# Patient Record
Sex: MALE | Race: WHITE | URBAN - METROPOLITAN AREA
[De-identification: names, ages, dates, MRNs, and addresses within clinical notes are randomized per-mention and may not be internally consistent; named-entity substitution may affect disease eponyms.]

---

## 2018-06-27 ENCOUNTER — EMERGENCY (EMERGENCY)
Facility: HOSPITAL | Age: 43
LOS: 0 days | Discharge: HOME | End: 2018-06-28
Attending: EMERGENCY MEDICINE | Admitting: EMERGENCY MEDICINE

## 2018-06-27 VITALS
OXYGEN SATURATION: 96 % | TEMPERATURE: 99 F | SYSTOLIC BLOOD PRESSURE: 139 MMHG | HEART RATE: 70 BPM | DIASTOLIC BLOOD PRESSURE: 86 MMHG | RESPIRATION RATE: 18 BRPM

## 2018-06-27 VITALS
HEIGHT: 72 IN | SYSTOLIC BLOOD PRESSURE: 158 MMHG | TEMPERATURE: 98 F | WEIGHT: 179.9 LBS | HEART RATE: 68 BPM | OXYGEN SATURATION: 96 % | DIASTOLIC BLOOD PRESSURE: 90 MMHG | RESPIRATION RATE: 19 BRPM
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
## 2018-06-27 DIAGNOSIS — F17.210 NICOTINE DEPENDENCE, CIGARETTES, UNCOMPLICATED: ICD-10-CM
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
## 2018-06-27 DIAGNOSIS — Z79.899 OTHER LONG TERM (CURRENT) DRUG THERAPY: ICD-10-CM

## 2018-06-27 DIAGNOSIS — S20.311A ABRASION OF RIGHT FRONT WALL OF THORAX, INITIAL ENCOUNTER: ICD-10-CM
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
## 2018-06-27 DIAGNOSIS — S40.812A ABRASION OF LEFT UPPER ARM, INITIAL ENCOUNTER: ICD-10-CM

## 2018-06-27 DIAGNOSIS — S01.01XA LACERATION WITHOUT FOREIGN BODY OF SCALP, INITIAL ENCOUNTER: ICD-10-CM

## 2018-06-27 DIAGNOSIS — S00.81XA ABRASION OF OTHER PART OF HEAD, INITIAL ENCOUNTER: ICD-10-CM
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
## 2018-06-27 LAB
ALBUMIN SERPL ELPH-MCNC: 3.8 G/DL — SIGNIFICANT CHANGE UP (ref 3.5–5.2)
ALBUMIN SERPL ELPH-MCNC: 4.3 G/DL — SIGNIFICANT CHANGE UP (ref 3.5–5.2)
ALP SERPL-CCNC: 61 U/L — SIGNIFICANT CHANGE UP (ref 30–115)
ALP SERPL-CCNC: 75 U/L — SIGNIFICANT CHANGE UP (ref 30–115)
ALT FLD-CCNC: 43 U/L — HIGH (ref 0–41)
ALT FLD-CCNC: 49 U/L — HIGH (ref 0–41)
ANION GAP SERPL CALC-SCNC: 10 MMOL/L — SIGNIFICANT CHANGE UP (ref 7–14)
ANION GAP SERPL CALC-SCNC: 13 MMOL/L — SIGNIFICANT CHANGE UP (ref 7–14)
APAP SERPL-MCNC: <5 UG/ML — LOW (ref 10–30)
APPEARANCE UR: CLEAR — SIGNIFICANT CHANGE UP
APTT BLD: 27.3 SEC — SIGNIFICANT CHANGE UP (ref 27–39.2)
APTT BLD: 32.7 SEC — SIGNIFICANT CHANGE UP (ref 27–39.2)
AST SERPL-CCNC: 37 U/L — SIGNIFICANT CHANGE UP (ref 0–41)
AST SERPL-CCNC: 40 U/L — SIGNIFICANT CHANGE UP (ref 0–41)
BASOPHILS # BLD AUTO: 0.05 K/UL — SIGNIFICANT CHANGE UP (ref 0–0.2)
BASOPHILS # BLD AUTO: 0.07 K/UL — SIGNIFICANT CHANGE UP (ref 0–0.2)
BASOPHILS NFR BLD AUTO: 0.4 % — SIGNIFICANT CHANGE UP (ref 0–1)
BASOPHILS NFR BLD AUTO: 0.7 % — SIGNIFICANT CHANGE UP (ref 0–1)
BILIRUB SERPL-MCNC: 0.3 MG/DL — SIGNIFICANT CHANGE UP (ref 0.2–1.2)
BILIRUB SERPL-MCNC: 0.3 MG/DL — SIGNIFICANT CHANGE UP (ref 0.2–1.2)
BILIRUB UR-MCNC: NEGATIVE — SIGNIFICANT CHANGE UP
BUN SERPL-MCNC: 20 MG/DL — SIGNIFICANT CHANGE UP (ref 10–20)
BUN SERPL-MCNC: 24 MG/DL — HIGH (ref 10–20)
CALCIUM SERPL-MCNC: 8.3 MG/DL — LOW (ref 8.5–10.1)
CALCIUM SERPL-MCNC: 9.7 MG/DL — SIGNIFICANT CHANGE UP (ref 8.5–10.1)
CHLORIDE SERPL-SCNC: 103 MMOL/L — SIGNIFICANT CHANGE UP (ref 98–110)
CHLORIDE SERPL-SCNC: 106 MMOL/L — SIGNIFICANT CHANGE UP (ref 98–110)
CO2 SERPL-SCNC: 24 MMOL/L — SIGNIFICANT CHANGE UP (ref 17–32)
CO2 SERPL-SCNC: 27 MMOL/L — SIGNIFICANT CHANGE UP (ref 17–32)
COLOR SPEC: YELLOW — SIGNIFICANT CHANGE UP
CREAT SERPL-MCNC: 0.9 MG/DL — SIGNIFICANT CHANGE UP (ref 0.7–1.5)
CREAT SERPL-MCNC: 1.2 MG/DL — SIGNIFICANT CHANGE UP (ref 0.7–1.5)
DIFF PNL FLD: NEGATIVE — SIGNIFICANT CHANGE UP
EOSINOPHIL # BLD AUTO: 0.23 K/UL — SIGNIFICANT CHANGE UP (ref 0–0.7)
EOSINOPHIL # BLD AUTO: 0.61 K/UL — SIGNIFICANT CHANGE UP (ref 0–0.7)
EOSINOPHIL NFR BLD AUTO: 2 % — SIGNIFICANT CHANGE UP (ref 0–8)
EOSINOPHIL NFR BLD AUTO: 6.1 % — SIGNIFICANT CHANGE UP (ref 0–8)
ETHANOL SERPL-MCNC: <10 MG/DL — HIGH
ETHANOL SERPL-MCNC: <10 MG/DL — HIGH
GLUCOSE SERPL-MCNC: 96 MG/DL — SIGNIFICANT CHANGE UP (ref 70–99)
GLUCOSE SERPL-MCNC: 98 MG/DL — SIGNIFICANT CHANGE UP (ref 70–99)
GLUCOSE UR QL: NEGATIVE MG/DL — SIGNIFICANT CHANGE UP
HCT VFR BLD CALC: 37 % — LOW (ref 42–52)
HCT VFR BLD CALC: 41.3 % — LOW (ref 42–52)
HGB BLD-MCNC: 12.7 G/DL — LOW (ref 14–18)
HGB BLD-MCNC: 14.2 G/DL — SIGNIFICANT CHANGE UP (ref 14–18)
IMM GRANULOCYTES NFR BLD AUTO: 0.5 % — HIGH (ref 0.1–0.3)
IMM GRANULOCYTES NFR BLD AUTO: 0.7 % — HIGH (ref 0.1–0.3)
INR BLD: 1.13 RATIO — SIGNIFICANT CHANGE UP (ref 0.65–1.3)
INR BLD: 1.27 RATIO — SIGNIFICANT CHANGE UP (ref 0.65–1.3)
KETONES UR-MCNC: NEGATIVE — SIGNIFICANT CHANGE UP
LACTATE SERPL-SCNC: 0.7 MMOL/L — SIGNIFICANT CHANGE UP (ref 0.5–2.2)
LACTATE SERPL-SCNC: 1.1 MMOL/L — SIGNIFICANT CHANGE UP (ref 0.5–2.2)
LEUKOCYTE ESTERASE UR-ACNC: NEGATIVE — SIGNIFICANT CHANGE UP
LIDOCAIN IGE QN: 27 U/L — SIGNIFICANT CHANGE UP (ref 7–60)
LIDOCAIN IGE QN: 27 U/L — SIGNIFICANT CHANGE UP (ref 7–60)
LYMPHOCYTES # BLD AUTO: 1.63 K/UL — SIGNIFICANT CHANGE UP (ref 1.2–3.4)
LYMPHOCYTES # BLD AUTO: 14 % — LOW (ref 20.5–51.1)
LYMPHOCYTES # BLD AUTO: 4.38 K/UL — HIGH (ref 1.2–3.4)
LYMPHOCYTES # BLD AUTO: 43.8 % — SIGNIFICANT CHANGE UP (ref 20.5–51.1)
MCHC RBC-ENTMCNC: 30.2 PG — SIGNIFICANT CHANGE UP (ref 27–31)
MCHC RBC-ENTMCNC: 30.5 PG — SIGNIFICANT CHANGE UP (ref 27–31)
MCHC RBC-ENTMCNC: 34.3 G/DL — SIGNIFICANT CHANGE UP (ref 32–37)
MCHC RBC-ENTMCNC: 34.4 G/DL — SIGNIFICANT CHANGE UP (ref 32–37)
MCV RBC AUTO: 87.9 FL — SIGNIFICANT CHANGE UP (ref 80–94)
MCV RBC AUTO: 88.6 FL — SIGNIFICANT CHANGE UP (ref 80–94)
MONOCYTES # BLD AUTO: 0.61 K/UL — HIGH (ref 0.1–0.6)
MONOCYTES # BLD AUTO: 0.62 K/UL — HIGH (ref 0.1–0.6)
MONOCYTES NFR BLD AUTO: 5.3 % — SIGNIFICANT CHANGE UP (ref 1.7–9.3)
MONOCYTES NFR BLD AUTO: 6.1 % — SIGNIFICANT CHANGE UP (ref 1.7–9.3)
NEUTROPHILS # BLD AUTO: 4.27 K/UL — SIGNIFICANT CHANGE UP (ref 1.4–6.5)
NEUTROPHILS # BLD AUTO: 9.08 K/UL — HIGH (ref 1.4–6.5)
NEUTROPHILS NFR BLD AUTO: 42.6 % — SIGNIFICANT CHANGE UP (ref 42.2–75.2)
NEUTROPHILS NFR BLD AUTO: 77.8 % — HIGH (ref 42.2–75.2)
NITRITE UR-MCNC: NEGATIVE — SIGNIFICANT CHANGE UP
NRBC # BLD: 0 /100 WBCS — SIGNIFICANT CHANGE UP (ref 0–0)
NRBC # BLD: 0 /100 WBCS — SIGNIFICANT CHANGE UP (ref 0–0)
PH UR: 6 — SIGNIFICANT CHANGE UP (ref 5–8)
PLATELET # BLD AUTO: 185 K/UL — SIGNIFICANT CHANGE UP (ref 130–400)
PLATELET # BLD AUTO: 235 K/UL — SIGNIFICANT CHANGE UP (ref 130–400)
POTASSIUM SERPL-MCNC: 4.3 MMOL/L — SIGNIFICANT CHANGE UP (ref 3.5–5)
POTASSIUM SERPL-MCNC: 4.4 MMOL/L — SIGNIFICANT CHANGE UP (ref 3.5–5)
POTASSIUM SERPL-SCNC: 4.3 MMOL/L — SIGNIFICANT CHANGE UP (ref 3.5–5)
POTASSIUM SERPL-SCNC: 4.4 MMOL/L — SIGNIFICANT CHANGE UP (ref 3.5–5)
PROT SERPL-MCNC: 6 G/DL — SIGNIFICANT CHANGE UP (ref 6–8)
PROT SERPL-MCNC: 7.3 G/DL — SIGNIFICANT CHANGE UP (ref 6–8)
PROT UR-MCNC: NEGATIVE MG/DL — SIGNIFICANT CHANGE UP
PROTHROM AB SERPL-ACNC: 12.2 SEC — SIGNIFICANT CHANGE UP (ref 9.95–12.87)
PROTHROM AB SERPL-ACNC: 13.7 SEC — HIGH (ref 9.95–12.87)
RBC # BLD: 4.21 M/UL — LOW (ref 4.7–6.1)
RBC # BLD: 4.66 M/UL — LOW (ref 4.7–6.1)
RBC # FLD: 12.9 % — SIGNIFICANT CHANGE UP (ref 11.5–14.5)
RBC # FLD: 13.2 % — SIGNIFICANT CHANGE UP (ref 11.5–14.5)
SALICYLATES SERPL-MCNC: <0.3 MG/DL — LOW (ref 4–30)
SODIUM SERPL-SCNC: 140 MMOL/L — SIGNIFICANT CHANGE UP (ref 135–146)
SODIUM SERPL-SCNC: 143 MMOL/L — SIGNIFICANT CHANGE UP (ref 135–146)
SP GR SPEC: 1.02 — SIGNIFICANT CHANGE UP (ref 1.01–1.03)
TROPONIN T SERPL-MCNC: <0.01 NG/ML — SIGNIFICANT CHANGE UP
UROBILINOGEN FLD QL: 0.2 MG/DL — SIGNIFICANT CHANGE UP (ref 0.2–0.2)
WBC # BLD: 10.01 K/UL — SIGNIFICANT CHANGE UP (ref 4.8–10.8)
WBC # BLD: 11.67 K/UL — HIGH (ref 4.8–10.8)
WBC # FLD AUTO: 10.01 K/UL — SIGNIFICANT CHANGE UP (ref 4.8–10.8)
WBC # FLD AUTO: 11.67 K/UL — HIGH (ref 4.8–10.8)

## 2018-06-27 RX ORDER — MORPHINE SULFATE 50 MG/1
4 CAPSULE, EXTENDED RELEASE ORAL ONCE
Qty: 0 | Refills: 0 | Status: DISCONTINUED | OUTPATIENT
Start: 2018-06-27 | End: 2018-06-27

## 2018-06-27 RX ORDER — SODIUM CHLORIDE 9 MG/ML
1000 INJECTION INTRAMUSCULAR; INTRAVENOUS; SUBCUTANEOUS ONCE
Qty: 0 | Refills: 0 | Status: COMPLETED | OUTPATIENT
Start: 2018-06-27 | End: 2018-06-27

## 2018-06-27 RX ORDER — MORPHINE SULFATE 50 MG/1
2 CAPSULE, EXTENDED RELEASE ORAL ONCE
Qty: 0 | Refills: 0 | Status: DISCONTINUED | OUTPATIENT
Start: 2018-06-27 | End: 2018-06-27

## 2018-06-27 RX ADMIN — SODIUM CHLORIDE 2000 MILLILITER(S): 9 INJECTION INTRAMUSCULAR; INTRAVENOUS; SUBCUTANEOUS at 18:25

## 2018-06-27 RX ADMIN — MORPHINE SULFATE 4 MILLIGRAM(S): 50 CAPSULE, EXTENDED RELEASE ORAL at 21:41

## 2018-06-27 RX ADMIN — MORPHINE SULFATE 2 MILLIGRAM(S): 50 CAPSULE, EXTENDED RELEASE ORAL at 21:41

## 2018-06-27 NOTE — ED PROVIDER NOTE - NS ED ROS FT
Review of Systems    Constitutional: (-) fever/ chills (-) weight loss  Eyes/ENT: (-) blurry vision, (-) epistaxis (-) sore throat (-) ear pain  Cardiovascular: (-) chest pain, (-) syncope (-) palpitations  Respiratory: (-) cough, (-) shortness of breath  Gastrointestinal: (-) vomiting, (-) diarrhea (-) abdominal pain  Musculoskeletal: (-) neck pain, (-) back pain, (-) joint pain (-) pedal edema   Integumentary: (-) rash, (-) swelling +large lacerations   Neurological: (-) headache, (-) altered mental status  Psychiatric: (-) hallucinations or depression   Allergic/Immunologic: (-) pruritus

## 2018-06-27 NOTE — H&P ADULT - HISTORY OF PRESENT ILLNESS
43 y/o male s/p fall from scaffolding at approx 20 feet, unwitnessed, +ht +loc -bt, c/o left shoulder pain

## 2018-06-27 NOTE — ED PROVIDER NOTE - OBJECTIVE STATEMENT
41 y/o male s/p fall approximately 20 feet as per [employer] P[facility]. patient sustained lacerations to left scalp , forehead, multiple abrasions and had unknown LOC. patient denies any headache, vomiting, nausea, abdominal or back or neck pain.

## 2018-06-27 NOTE — ED PROVIDER NOTE - ATTENDING CONTRIBUTION TO CARE
43 yo M presents with c/o fall from scaffold. Pt is a , [facility], [facility] fall was 20 ft.  Pt hit head, unknown LOC, Tetanus UTD, no neck or back pain, no numbness or tingling.  On exam pt in NAD AA O x 3, GCS 15, + large laceration to left scalp, + abrasion to left forehead with swelling to right forehead with abrasion, PERRL, no midline vertebral tenderness, C collar placed by ED, + abrasion to left arm and right chest/abdomen, abd soft nt nd, Ext with FROM, no bony tenderness,

## 2018-06-27 NOTE — H&P ADULT - NSHPLABSRESULTS_GEN_ALL_CORE
(06-27 @ 18:28)                      14.2  10.01 )-----------( 235                 41.3    Neutrophils = 4.27 (42.6%)  Lymphocytes = 4.38 (43.8%)  Eosinophils = 0.61 (6.1%)  Basophils = 0.07 (0.7%)  Monocytes = 0.61 (6.1%)  Bands = --%    06-27    143  |  103  |  24<H>  ----------------------------<  98  4.3   |  27  |  1.2    Ca    9.7      27 Jun 2018 18:28    TPro  7.3  /  Alb  4.3  /  TBili  0.3  /  DBili  x   /  AST  40  /  ALT  49<H>  /  AlkPhos  75  06-27    ( 27 Jun 2018 18:28 )   PT: 12.20 sec;   INR: 1.13 ratio;       PTT:27.3 sec

## 2018-06-27 NOTE — ED PROVIDER NOTE - MEDICAL DECISION MAKING DETAILS
Pt with 20 ft fall per [employer]. Case d/w Dr Pena, accepts transfer Pt with 20 ft fall per [facility]. Case d/w Dr Pena, accepts transfer.    [place] patient remains aaox3, gcs 15, no midline ctls spine tenderness, + scalp laceration that requires repair. Patient ambulated with stable gait, has been cleared by trauma. Patient requesting to go home Pt with 20 ft fall per [facility]. Case d/w Dr Pena, accepts transfer.    I personally evaluated the patient. I reviewed the Resident’s or Physician Assistant’s note (as assigned above), and agree with the findings and plan except as documented in my note. Patient ambulated with stable gait. Patient had 21 staples placed. Patient repeat neuro exam unremarkable. Patient aa ox3, non focal exam. Understands to return for staple removal     Patient arrived safely from the [facility] site. patient remains aaox3, gcs 15, no midline ctls spine tenderness, + scalp laceration that requires repair. Patient ambulated with stable gait, has been cleared by trauma. Patient requesting to go home

## 2018-06-27 NOTE — H&P ADULT - ASSESSMENT
42 year old male s/p fall from 20 ft, +LOC +HT, c/o left shoulder pain, left scapula pain  -awaiting pan ct scan 42 year old male s/p fall from 20 ft, +LOC +HT, c/o left shoulder pain, left scapula pain  -awaiting pan ct scan      Addendum:  CT images reviewed- negative for traumatic injuries  cleared from trauma

## 2018-06-27 NOTE — ED ADULT TRIAGE NOTE - CHIEF COMPLAINT QUOTE
fell 10 feet off scaffold.  hit head.  loc.  no blood thinners fell 20 feet off scaffold as witnessed by police.  hit head.  loc.  no blood thinners fell 20 feet off scaffold as witnessed by police who were called to the scene after the fall.  hit head.  loc.  no blood thinners

## 2018-06-27 NOTE — H&P ADULT - NSHPPHYSICALEXAM_GEN_ALL_CORE
Vital Signs Last 24 Hrs  T(F): 98.5 (27 Jun 2018 18:06), Max: 98.5 (27 Jun 2018 18:06)  HR: 68 (27 Jun 2018 18:06) (68 - 68)  BP: 158/90 (27 Jun 2018 18:06) (158/90 - 158/90)  RR: 19 (27 Jun 2018 18:06) (19 - 19)  SpO2: 96% (27 Jun 2018 18:06) (96% - 96%)    PHYSICAL EXAM:  GENERAL: A&O, NAD  HEENT: normocephalic, atraumatic  CHEST/LUNG: B/L breath sounds, right side chest abrasion  HEART: Regular rate and rhythm  ABDOMEN: Soft, Nontender, Nondistended, pelvis stable  EXTREMITIES:  moving all extremities, left scapula and shoulder tenderness, No clubbing, cyanosis, or edema

## 2018-06-27 NOTE — ED PROVIDER NOTE - PHYSICAL EXAMINATION
no AOB    skin: large stellate laceration to left scalp / no bleeding  +multiple abrasions noted to left arm / forearm , right chest , right forehead

## 2018-06-30 LAB — DRUG SCREEN, SERUM: SIGNIFICANT CHANGE UP

## 2019-04-16 NOTE — ED PROVIDER NOTE - CARE PLAN
Addended by: SANAZ FAIR on: 4/16/2019 09:59 AM     Modules accepted: Orders     Principal Discharge DX:	Multiple trauma  Secondary Diagnosis:	Fall from scaffold, initial encounter Principal Discharge DX:	Multiple trauma  Secondary Diagnosis:	Fall from scaffold, initial encounter  Secondary Diagnosis:	Scalp laceration, initial encounter

## 2019-12-25 ENCOUNTER — EMERGENCY (EMERGENCY)
Facility: HOSPITAL | Age: 44
LOS: 0 days | Discharge: HOME | End: 2019-12-25
Attending: EMERGENCY MEDICINE | Admitting: EMERGENCY MEDICINE
Payer: SUBSIDIZED

## 2019-12-25 VITALS
OXYGEN SATURATION: 96 % | DIASTOLIC BLOOD PRESSURE: 98 MMHG | SYSTOLIC BLOOD PRESSURE: 151 MMHG | HEIGHT: 72 IN | WEIGHT: 199.96 LBS | HEART RATE: 93 BPM | TEMPERATURE: 96 F | RESPIRATION RATE: 20 BRPM

## 2019-12-25 VITALS
DIASTOLIC BLOOD PRESSURE: 69 MMHG | RESPIRATION RATE: 18 BRPM | OXYGEN SATURATION: 97 % | HEART RATE: 70 BPM | SYSTOLIC BLOOD PRESSURE: 125 MMHG

## 2019-12-25 DIAGNOSIS — F11.10 OPIOID ABUSE, UNCOMPLICATED: ICD-10-CM
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
## 2019-12-25 DIAGNOSIS — Z23 ENCOUNTER FOR IMMUNIZATION: ICD-10-CM
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
## 2019-12-25 DIAGNOSIS — S01.311A LACERATION WITHOUT FOREIGN BODY OF RIGHT EAR, INITIAL ENCOUNTER: ICD-10-CM
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
## 2019-12-25 LAB
ALBUMIN SERPL ELPH-MCNC: 3.9 G/DL — SIGNIFICANT CHANGE UP (ref 3.5–5.2)
ALP SERPL-CCNC: 95 U/L — SIGNIFICANT CHANGE UP (ref 30–115)
ALT FLD-CCNC: 15 U/L — SIGNIFICANT CHANGE UP (ref 0–41)
ANION GAP SERPL CALC-SCNC: 13 MMOL/L — SIGNIFICANT CHANGE UP (ref 7–14)
APAP SERPL-MCNC: <5 UG/ML — LOW (ref 10–30)
AST SERPL-CCNC: 26 U/L — SIGNIFICANT CHANGE UP (ref 0–41)
BASOPHILS # BLD AUTO: 0.04 K/UL — SIGNIFICANT CHANGE UP (ref 0–0.2)
BASOPHILS NFR BLD AUTO: 0.5 % — SIGNIFICANT CHANGE UP (ref 0–1)
BILIRUB DIRECT SERPL-MCNC: <0.2 MG/DL — SIGNIFICANT CHANGE UP (ref 0–0.2)
BILIRUB INDIRECT FLD-MCNC: >0.1 MG/DL — LOW (ref 0.2–1.2)
BILIRUB SERPL-MCNC: 0.3 MG/DL — SIGNIFICANT CHANGE UP (ref 0.2–1.2)
BUN SERPL-MCNC: 19 MG/DL — SIGNIFICANT CHANGE UP (ref 10–20)
CALCIUM SERPL-MCNC: 9.1 MG/DL — SIGNIFICANT CHANGE UP (ref 8.5–10.1)
CHLORIDE SERPL-SCNC: 101 MMOL/L — SIGNIFICANT CHANGE UP (ref 98–110)
CO2 SERPL-SCNC: 25 MMOL/L — SIGNIFICANT CHANGE UP (ref 17–32)
CREAT SERPL-MCNC: 1.5 MG/DL — SIGNIFICANT CHANGE UP (ref 0.7–1.5)
EOSINOPHIL # BLD AUTO: 0.46 K/UL — SIGNIFICANT CHANGE UP (ref 0–0.7)
EOSINOPHIL NFR BLD AUTO: 5.8 % — SIGNIFICANT CHANGE UP (ref 0–8)
ETHANOL SERPL-MCNC: <10 MG/DL — SIGNIFICANT CHANGE UP
GLUCOSE SERPL-MCNC: 177 MG/DL — HIGH (ref 70–99)
HCT VFR BLD CALC: 38.2 % — LOW (ref 42–52)
HGB BLD-MCNC: 12.9 G/DL — LOW (ref 14–18)
IMM GRANULOCYTES NFR BLD AUTO: 0.1 % — SIGNIFICANT CHANGE UP (ref 0.1–0.3)
LYMPHOCYTES # BLD AUTO: 2.98 K/UL — SIGNIFICANT CHANGE UP (ref 1.2–3.4)
LYMPHOCYTES # BLD AUTO: 37.6 % — SIGNIFICANT CHANGE UP (ref 20.5–51.1)
MCHC RBC-ENTMCNC: 29.9 PG — SIGNIFICANT CHANGE UP (ref 27–31)
MCHC RBC-ENTMCNC: 33.8 G/DL — SIGNIFICANT CHANGE UP (ref 32–37)
MCV RBC AUTO: 88.6 FL — SIGNIFICANT CHANGE UP (ref 80–94)
MONOCYTES # BLD AUTO: 0.62 K/UL — HIGH (ref 0.1–0.6)
MONOCYTES NFR BLD AUTO: 7.8 % — SIGNIFICANT CHANGE UP (ref 1.7–9.3)
NEUTROPHILS # BLD AUTO: 3.81 K/UL — SIGNIFICANT CHANGE UP (ref 1.4–6.5)
NEUTROPHILS NFR BLD AUTO: 48.2 % — SIGNIFICANT CHANGE UP (ref 42.2–75.2)
NRBC # BLD: 0 /100 WBCS — SIGNIFICANT CHANGE UP (ref 0–0)
PLATELET # BLD AUTO: 202 K/UL — SIGNIFICANT CHANGE UP (ref 130–400)
POTASSIUM SERPL-MCNC: 3.4 MMOL/L — LOW (ref 3.5–5)
POTASSIUM SERPL-SCNC: 3.4 MMOL/L — LOW (ref 3.5–5)
PROT SERPL-MCNC: 6.4 G/DL — SIGNIFICANT CHANGE UP (ref 6–8)
RBC # BLD: 4.31 M/UL — LOW (ref 4.7–6.1)
RBC # FLD: 13.5 % — SIGNIFICANT CHANGE UP (ref 11.5–14.5)
SALICYLATES SERPL-MCNC: <0.3 MG/DL — LOW (ref 4–30)
SODIUM SERPL-SCNC: 139 MMOL/L — SIGNIFICANT CHANGE UP (ref 135–146)
WBC # BLD: 7.92 K/UL — SIGNIFICANT CHANGE UP (ref 4.8–10.8)
WBC # FLD AUTO: 7.92 K/UL — SIGNIFICANT CHANGE UP (ref 4.8–10.8)

## 2019-12-25 PROCEDURE — 99053 MED SERV 10PM-8AM 24 HR FAC: CPT

## 2019-12-25 PROCEDURE — 99284 EMERGENCY DEPT VISIT MOD MDM: CPT

## 2019-12-25 PROCEDURE — 70450 CT HEAD/BRAIN W/O DYE: CPT | Mod: 26

## 2019-12-25 PROCEDURE — 71045 X-RAY EXAM CHEST 1 VIEW: CPT | Mod: 26

## 2019-12-25 RX ORDER — TETANUS TOXOID, REDUCED DIPHTHERIA TOXOID AND ACELLULAR PERTUSSIS VACCINE, ADSORBED 5; 2.5; 8; 8; 2.5 [IU]/.5ML; [IU]/.5ML; UG/.5ML; UG/.5ML; UG/.5ML
0.5 SUSPENSION INTRAMUSCULAR ONCE
Refills: 0 | Status: COMPLETED | OUTPATIENT
Start: 2019-12-25 | End: 2019-12-25

## 2019-12-25 RX ORDER — METOCLOPRAMIDE HCL 10 MG
10 TABLET ORAL ONCE
Refills: 0 | Status: DISCONTINUED | OUTPATIENT
Start: 2019-12-25 | End: 2019-12-25

## 2019-12-25 RX ORDER — ONDANSETRON 8 MG/1
4 TABLET, FILM COATED ORAL ONCE
Refills: 0 | Status: COMPLETED | OUTPATIENT
Start: 2019-12-25 | End: 2019-12-25

## 2019-12-25 RX ORDER — NALOXONE HYDROCHLORIDE 4 MG/.1ML
0.4 SPRAY NASAL ONCE
Refills: 0 | Status: COMPLETED | OUTPATIENT
Start: 2019-12-25 | End: 2019-12-25

## 2019-12-25 RX ADMIN — TETANUS TOXOID, REDUCED DIPHTHERIA TOXOID AND ACELLULAR PERTUSSIS VACCINE, ADSORBED 0.5 MILLILITER(S): 5; 2.5; 8; 8; 2.5 SUSPENSION INTRAMUSCULAR at 03:52

## 2019-12-25 RX ADMIN — ONDANSETRON 4 MILLIGRAM(S): 8 TABLET, FILM COATED ORAL at 03:52

## 2019-12-25 RX ADMIN — NALOXONE HYDROCHLORIDE 0.4 MILLIGRAM(S): 4 SPRAY NASAL at 04:42

## 2019-12-25 NOTE — ED PROVIDER NOTE - NSFOLLOWUPINSTRUCTIONS_ED_ALL_ED_FT
Finding Treatment for Addiction    Addiction is a complex disease of the brain that causes an uncontrollable (compulsive) need for:  A substance. This includes alcohol, illegal drugs, or prescription medicines, such as painkillers. An activity or behavior, such as gambling or shopping. Addiction changes the way your brain works. Because of this change:  The need for the medicine, drug, or activity can become so strong that you think about it all the time. Getting more and more of your addiction becomes the most important thing to you. You may find yourself leaving other activities and relationships to pursue your addiction. You can become physically dependent on a substance. Your health, behavior, emotions, and relationships can change for the worse. How do I know if I need treatment for addiction?  Addiction is a progressive disease. Without treatment, addiction can get worse. Living with addiction puts you at higher risk for injury, poor health, loss of employment, loss of money, and even death.    You might need treatment for addiction if:  You have tried to stop or cut down, but you have not succeeded. You find it annoying that your friends and family are concerned about your use or behavior. You feel guilty about your use or behavior. You need a particular substance or activity to start your day or to calm down. You are running out of money because of your addiction. You have done something illegal to support your addiction.     Your addiction has caused you:  Health problems. Trouble in school, work, home, or with the police. To devote all your time to your addiction, and not to other responsibilities. To tell lies in order to hide your problem. What types of treatment are available?  There may be options for treatment programs and plans based on your addiction, condition, needs, and preferences. No single treatment is right for everyone.  Treatment programs can be:  Outpatient. You live at home and go to work or school, but you go to a clinic for treatment.Inpatient. You live and sleep at the program facility during treatment. Programs may include:  Medicine. You may need medicine to treat the addiction itself, or to treat anxiety or depression.Counseling and behavior therapy. This can help individuals and families behave in healthier ways and relate more effectively.Support groups. Confidential group therapy, such as a 12-step program, can help individuals and families during treatment and recovery.A combination of education, counseling, and a 12-step, spirituality-based approach.What should I consider when selecting a treatment program?  Think about your individual requirements when selecting a treatment program. Ask about:  The overall approach to treatment.   Some programs are strictly 12-step programs. Some have a more flexible approach.Programs may differ in length of stay, setting, and size.Some programs include your family in your treatment plan. Support may be offered to them throughout the treatment process, as well as instructions for them when you are discharged. You may continue to receive support after you have left the program. The types of medical services that are offered. Find out if the program:  Offers specific treatment for your particular addiction. Meets all of your needs, including physical and cultural needs. Includes any medicines you might need. Offers mental health counseling as part of your treatment. Offers the 12-step meetings at the center, or if transport is available for patients to attend meetings at other locations. The cost and types of insurance that are accepted.  Some programs are sponsored by the government. They support patients who do not have private insurance. If you do not have insurance, or if you choose to attend a program that does not accept your insurance, call the treatment center. Tell them your financial needs and whether a payment plan can be set up. There are also organizations that will help you find the resources for treatment. You can find them online by searching "treatment for addiction." If the program is certified by the appropriate government agency. Where to find support  Your health care provider can help you to find the right treatment. These discussions are confidential. The National [place] on Alcoholism and Drug Dependence (NCADD). This group has information about treatment centers and programs for people who have an addiction and for family members.  Call: 5-708-SGA-CALL (1-182.418.2431).Visit the website: https://www.ncadd.org/The Substance Abuse and Mental Health Services Administration (SAMHSA). This organization will help you find publicly funded treatment centers, help hotlines, and counseling services near you.  Call: 7-278-089-HELP (1-481.167.8709).Visit the website: www.findtreatment.samhsa.govThe National Problem Gambling Helpline. This is a 24-hour confidential helpline for gambling addiction.  Call: 1-543.568.5893Visit the website: https://www.[facility].org/In countries outside of the [place] and [place], look in local directories for contact information for services in your area.  Follow these instructions at home:  Find supportive people who will help you stay away from your addiction and stay sober. Do not use the substance or engage in the activity.    If you have been through treatment:  Follow your plan. The plan is usually developed by you and your health care provider during treatment. Go to meetings with other people in recovery. Avoid people, situations, and things that lead you to do the things you are addicted to (triggers).    Summary  Addiction changes the way your brain works. These changes cause a desire to repeat and increase the use of the a substance or behavior. Addiction is a progressive disease. Without treatment, addiction can get worse. Living with addiction puts you at higher risk for injury, poor health, loss of employment, loss of money, and even death. There may be options for treatment programs and plans based on your addiction, condition, needs, and preferences. No single treatment is right for everyone. Your health care provider can help you to find the right treatment. These discussions are confidential.  This information is not intended to replace advice given to you by your health care provider. Make sure you discuss any questions you have with your health care provider.          Opioid Overdose    Opioids are substances that relieve pain by binding to pain receptors in your brain and spinal cord. Opioids include illegal drugs, such as heroin, as well as prescription pain medicines. An opioid overdose happens when you take too much of an opioid substance. This can happen with any type of opioid, including:  Heroin. Morphine. Codeine. Methadone. Oxycodone. Hydrocodone. Fentanyl. Hydromorphone. Buprenorphine. The effects of an overdose can be mild, dangerous, or even deadly.     Opioid overdose is a medical emergency.  What are the causes?  This condition may be caused by:  Taking too much of an opioid by accident. Taking too much of an opioid on purpose. An error made by a health care provider who prescribes a medicine. An error made by the pharmacist who fills the prescription order. Using more than one substance that contains opioids at the same time. Mixing an opioid with a substance that affects your heart, breathing, or blood pressure. These include alcohol, tranquilizers, sleeping pills, illegal drugs, and some over-the-counter medicines. What increases the risk?  This condition is more likely in:  Children. They may be attracted to colorful pills. Because of a child's small size, even a small amount of a drug can be dangerous. Elderly people. They may be taking many different drugs. Elderly people may have difficulty reading labels or remembering when they last took their medicine. People who take an opioid on a long-term basis.     People who use:  Illegal drugs. Other substances, including alcohol, while using an opioid.    People who have:  A history of drug or alcohol abuse. Certain mental health conditions. People who take opioids that are not prescribed for them.     What are the signs or symptoms?  Symptoms of this condition depend on the type of opioid and the amount that was taken. Common symptoms include:  Sleepiness or difficulty waking from sleep. Confusion. Slurred speech. Slowed breathing and a slow pulse. Nausea and vomiting. Abnormally small pupils.     Signs and symptoms that require emergency treatment include:  Cold, clammy, and pale skin .Blue lips and fingernails. Vomiting. Gurgling sounds in the throat. A pulse that is very slow or difficult to detect. Breathing that is very slow, noisy, or difficult to detect. Limp body. Inability to respond to speech or be awakened from sleep (stupor).How is this diagnosed?  This condition is diagnosed based on your symptoms. It is important to tell your health care provider:  All of the opioids that you took. When you took the opioids. Whether you were drinking alcohol or using other substances. Your health care provider will do a physical exam. This exam may include:  Checking and monitoring your heart rate and rhythm, your breathing rate and depth, your temperature, and your blood pressure (vital signs). Checking for abnormally small pupils. Measuring oxygen levels in your blood. You may also have blood tests or urine tests.    How is this treated?  Supporting your vital signs and your breathing is the first step in treating an opioid overdose. Treatment may also include:  Giving fluids and minerals (electrolytes) through an IV tube.Inserting a breathing tube (endotracheal tube) in your airway to help you breathe.Giving oxygen.Passing a tube through your nose and into your stomach (NG tube, or nasogastric tube) to wash out your stomach.Giving medicines that:  Increase your blood pressure.Absorb any opioid that is in your digestive system.Reverse the effects of the opioid (naloxone).Ongoing counseling and mental health support if you intentionally overdosed or used an illegal drug.Follow these instructions at home:     Take over-the-counter and prescription medicines only as told by your health care provider. Always ask your health care provider about possible side effects and interactions of any new medicine that you start taking.Keep a list of all of the medicines that you take, including over-the-counter medicines. Bring this list with you to all of your medical visits.Drink enough fluid to keep your urine clear or pale yellow.Keep all follow-up visits as told by your health care provider. This is important.How is this prevented?  Get help if you are struggling with:  Alcohol or drug use.Depression or another mental health problem.Keep the phone number of your local poison control center near your phone or on your cell phone.Store all medicines in safety containers that are out of the reach of children.Read the drug inserts that come with your medicines.Do not drink alcohol when taking opioids.Do not use illegal drugs.Do not take opioid medicines that are not prescribed for you.Contact a health care provider if:  Your symptoms return.You develop new symptoms or side effects when you are taking medicines.Get help right away if:  You think that you or someone else may have taken too much of an opioid. The hotline of the National Poison Control Center is (427) 589-4798.You or someone else is having symptoms of an opioid overdose.You have serious thoughts about hurting yourself or others.You have:  Chest pain.Difficulty breathing.A loss of consciousness.Opioid overdose is an emergency. Do not wait to see if the symptoms will go away. Get medical help right away. Call your local emergency services (911 in the [place] Do not drive yourself to the hospital.   This information is not intended to replace advice given to you by your health care provider. Make sure you discuss any questions you have with your health care provider.

## 2019-12-25 NOTE — ED ADULT NURSE NOTE - MUSCULOSKELETAL ASSESSMENT
WDL Prep Text (Optional): Prior to removal the treatment areas were prepped in the usual fashion. Consent was obtained and risks were reviewed including but not limited to scarring, infection, bleeding, scabbing, incomplete removal, and allergy to anesthesia. Extraction Method: 11 blade and comedo extractor Detail Level: Detailed Post-Care Instructions: I reviewed with the patient in detail post-care instructions. Patient is to keep the treatment areas dry overnight, and then apply bacitracin twice daily until healed. Patient may apply hydrogen peroxide soaks to remove any crusting. Acne Type: Comedonal Lesions Render Number Of Lesions Treated: no

## 2019-12-25 NOTE — ED PROVIDER NOTE - PROGRESS NOTE DETAILS
relay peers aware of pt. peers at bedside pt required another narcan dose. Will continue to observe. yash - s/o to me by Dr. Arana. s/p narcan. Observation/reassess yash - narcan kit provided. Nephew at bedside, will accompany patient home. On my reassessment, pt states feeling well/baseline. No SOB. No acute complaints. Lungs clear. Vitals stable.

## 2019-12-25 NOTE — ED ADULT TRIAGE NOTE - CHIEF COMPLAINT QUOTE
pt at friends [facility] overdosed on heroin. pt awake and responsive upon arrival to ER. pt states he shot one dose of heroin via needle. Pt given 4mg of Narcan intranasal in field by EMT.

## 2019-12-25 NOTE — ED PROVIDER NOTE - OBJECTIVE STATEMENT
43 yo male, pmh of substance abuse, presents to ed via ems for overdose. pt received 4 mg IN narcan in field. Pt states injected heroin. No specific complaints. Denies fever, chills, cp, sob, le swelling, nvd, back pain, trauma.

## 2019-12-25 NOTE — ED PROVIDER NOTE - ATTENDING CONTRIBUTION TO CARE
I personally evaluated the patient. I reviewed the Resident’s or Physician Assistant’s note (as assigned above), and agree with the findings and plan except as documented in my note.    45 y/o M presents s/p narcan for heroin overdose. patient received IN narcan in the field and woke up immediately. Pt reports injecting heroine earlier tonight. On exam has a small head abrasion and r ear abrasion. No other apparent injuries.     CONSTITUTIONAL: Well-developed; well-nourished; in no acute distress. Sitting up and providing appropriate history and physical examination  SKIN: skin exam is warm and dry, no acute rash.  HEAD: Normocephalic; atraumatic.  EYES: PERRL, 3 mm bilateral, no nystagmus, EOM intact; conjunctiva and sclera clear.  ENT: r ear- abrasion of the pina   NECK: Supple; non tender.+ full passive ROM in all directions. No JVD  CARD: S1, S2 normal; no murmurs, gallops, or rubs. Regular rate and rhythm. + Symmetric Strong Pulses  RESP: No wheezes, rales or rhonchi. Good air movement bilaterally  ABD: soft; non-distended; non-tender. No Rebound, No Gaurding, No signs of peritnitis, No CVA tenderness  EXT: Normal ROM. No clubbing, cyanosis or edema. Dp and Pt Pulses intact. Cap refill less than 3 seconds  NEURO: CN 2-12 intact, moving all extremeties  PSYCH: No SI or HI    Plan- CT head, labs, observation

## 2019-12-25 NOTE — ED PROVIDER NOTE - CLINICAL SUMMARY MEDICAL DECISION MAKING FREE TEXT BOX
Patient was signed out to me (Dr. Perez) by Dr. Arana. 43yo M presents by EMS for suspected heroin overdose, s/p IN narcan in the field, pt woke up immediately. Right ear abrasion, dressed. Negative imaging. Labs unremarkable. Stable for dc. F/u with outpatient detox clinic.

## 2019-12-25 NOTE — ED PROVIDER NOTE - CHPI ED SYMPTOMS NEG
no decreased eating/drinking/no vomiting/no nausea/no numbness/no pain/no dizziness/no fever/no chills/no tingling/no weakness

## 2019-12-25 NOTE — ED PROVIDER NOTE - NSFOLLOWUPCLINICS_GEN_ALL_ED_FT
[facility]  Detox [facility]mt  [address] [place] [address]  Phone: [phone]  Fax:   Follow Up Time:

## 2019-12-25 NOTE — ED PROVIDER NOTE - NS ED ROS FT
Constitutional: (-) fever, (-) chills  Eyes: (-) visual changes  ENT: (-) nasal congestions  Cardiovascular: (-) chest pain, (-) syncope  Respiratory: (-) cough, (-) shortness of breath, (-) dyspnea,   Gastrointestinal: (-) vomiting, (-) diarrhea, (-)nausea,  Musculoskeletal: (-) neck pain, (-) back pain, (-) joint pain,  Integumentary: (-) rash, (-) edema, (-) bruises  Neurological: (-) headache, (-) loc, (-) dizziness, (-) tingling, (-)numbness  Peripheral Vascular: (-) leg swelling  :  (-)dysuria,  (-) hematuria  Allergic/Immunologic: (-) pruritus

## 2019-12-25 NOTE — ED PROVIDER NOTE - PHYSICAL EXAMINATION
Physical Exam    Vital Signs: I have reviewed the initial vital signs.  Constitutional: well-nourished, appears stated age, no acute distress  Eyes: Conjunctiva pink, Sclera clear.   Cardiovascular: S1 and S2, regular rate, regular rhythm, well-perfused extremities, radial pulses equal and 2+  Respiratory: unlabored respiratory effort, clear to auscultation bilaterally no wheezing, rales and rhonchi  Gastrointestinal: soft, non-tender abdomen, no pulsatile mass, normal bowl sounds  Musculoskeletal: supple neck, no lower extremity edema, no midline tenderness  Integumentary: warm, dry, no rash, small lac to right ear superiorly   Neurologic: awake, alert, nvi

## 2020-07-06 ENCOUNTER — OUTPATIENT (OUTPATIENT)
Dept: OUTPATIENT SERVICES | Facility: HOSPITAL | Age: 45
LOS: 1 days | Discharge: HOME | End: 2020-07-06

## 2020-07-06 DIAGNOSIS — Z00.8 ENCOUNTER FOR OTHER GENERAL EXAMINATION: ICD-10-CM

## 2020-08-10 ENCOUNTER — OUTPATIENT (OUTPATIENT)
Dept: OUTPATIENT SERVICES | Facility: HOSPITAL | Age: 45
LOS: 1 days | Discharge: HOME | End: 2020-08-10

## 2020-08-10 DIAGNOSIS — I49.9 CARDIAC ARRHYTHMIA, UNSPECIFIED: ICD-10-CM

## 2020-08-10 DIAGNOSIS — Z00.8 ENCOUNTER FOR OTHER GENERAL EXAMINATION: ICD-10-CM

## 2020-08-10 LAB
A1C WITH ESTIMATED AVERAGE GLUCOSE RESULT: 5.6 % — SIGNIFICANT CHANGE UP (ref 4–5.6)
ALBUMIN SERPL ELPH-MCNC: 4.4 G/DL — SIGNIFICANT CHANGE UP (ref 3.5–5.2)
ALP SERPL-CCNC: 88 U/L — SIGNIFICANT CHANGE UP (ref 30–115)
ALT FLD-CCNC: 14 U/L — SIGNIFICANT CHANGE UP (ref 0–41)
ANION GAP SERPL CALC-SCNC: 15 MMOL/L — HIGH (ref 7–14)
APPEARANCE UR: CLEAR — SIGNIFICANT CHANGE UP
AST SERPL-CCNC: 16 U/L — SIGNIFICANT CHANGE UP (ref 0–41)
BACTERIA # UR AUTO: ABNORMAL
BILIRUB SERPL-MCNC: 0.7 MG/DL — SIGNIFICANT CHANGE UP (ref 0.2–1.2)
BILIRUB UR-MCNC: ABNORMAL
BUN SERPL-MCNC: 21 MG/DL — HIGH (ref 10–20)
CALCIUM SERPL-MCNC: 9.6 MG/DL — SIGNIFICANT CHANGE UP (ref 8.5–10.1)
CHLORIDE SERPL-SCNC: 101 MMOL/L — SIGNIFICANT CHANGE UP (ref 98–110)
CHOLEST SERPL-MCNC: 143 MG/DL — SIGNIFICANT CHANGE UP (ref 100–200)
CO2 SERPL-SCNC: 22 MMOL/L — SIGNIFICANT CHANGE UP (ref 17–32)
COD CRY URNS QL: ABNORMAL
COLOR SPEC: YELLOW — SIGNIFICANT CHANGE UP
COMMENT - URINE: SIGNIFICANT CHANGE UP
CREAT SERPL-MCNC: 1.1 MG/DL — SIGNIFICANT CHANGE UP (ref 0.7–1.5)
DIFF PNL FLD: NEGATIVE — SIGNIFICANT CHANGE UP
EPI CELLS # UR: ABNORMAL /HPF
ESTIMATED AVERAGE GLUCOSE: 114 MG/DL — SIGNIFICANT CHANGE UP (ref 68–114)
GLUCOSE SERPL-MCNC: 96 MG/DL — SIGNIFICANT CHANGE UP (ref 70–99)
GLUCOSE UR QL: NEGATIVE MG/DL — SIGNIFICANT CHANGE UP
GRAN CASTS # UR COMP ASSIST: NEGATIVE — SIGNIFICANT CHANGE UP
HCT VFR BLD CALC: 45.7 % — SIGNIFICANT CHANGE UP (ref 42–52)
HDLC SERPL-MCNC: 49 MG/DL — SIGNIFICANT CHANGE UP
HGB BLD-MCNC: 14.9 G/DL — SIGNIFICANT CHANGE UP (ref 14–18)
HYALINE CASTS # UR AUTO: NEGATIVE — SIGNIFICANT CHANGE UP
KETONES UR-MCNC: NEGATIVE — SIGNIFICANT CHANGE UP
LEUKOCYTE ESTERASE UR-ACNC: NEGATIVE — SIGNIFICANT CHANGE UP
LIPID PNL WITH DIRECT LDL SERPL: 83 MG/DL — SIGNIFICANT CHANGE UP (ref 4–129)
MAGNESIUM SERPL-MCNC: 2 MG/DL — SIGNIFICANT CHANGE UP (ref 1.8–2.4)
MCHC RBC-ENTMCNC: 29.6 PG — SIGNIFICANT CHANGE UP (ref 27–31)
MCHC RBC-ENTMCNC: 32.6 G/DL — SIGNIFICANT CHANGE UP (ref 32–37)
MCV RBC AUTO: 90.9 FL — SIGNIFICANT CHANGE UP (ref 80–94)
NITRITE UR-MCNC: NEGATIVE — SIGNIFICANT CHANGE UP
NRBC # BLD: 0 /100 WBCS — SIGNIFICANT CHANGE UP (ref 0–0)
PH UR: 6.5 — SIGNIFICANT CHANGE UP (ref 5–8)
PLATELET # BLD AUTO: 255 K/UL — SIGNIFICANT CHANGE UP (ref 130–400)
POTASSIUM SERPL-MCNC: 4.3 MMOL/L — SIGNIFICANT CHANGE UP (ref 3.5–5)
POTASSIUM SERPL-SCNC: 4.3 MMOL/L — SIGNIFICANT CHANGE UP (ref 3.5–5)
PROT SERPL-MCNC: 7.1 G/DL — SIGNIFICANT CHANGE UP (ref 6–8)
PROT UR-MCNC: 30 MG/DL
RBC # BLD: 5.03 M/UL — SIGNIFICANT CHANGE UP (ref 4.7–6.1)
RBC # FLD: 13.6 % — SIGNIFICANT CHANGE UP (ref 11.5–14.5)
RBC CASTS # UR COMP ASSIST: ABNORMAL /HPF
SODIUM SERPL-SCNC: 138 MMOL/L — SIGNIFICANT CHANGE UP (ref 135–146)
SP GR SPEC: 1.02 — SIGNIFICANT CHANGE UP (ref 1.01–1.03)
TOTAL CHOLESTEROL/HDL RATIO MEASUREMENT: 2.9 RATIO — LOW (ref 4–5.5)
TRI-PHOS CRY UR QL COMP ASSIST: NEGATIVE — SIGNIFICANT CHANGE UP
TRIGL SERPL-MCNC: 70 MG/DL — SIGNIFICANT CHANGE UP (ref 10–149)
URATE CRY FLD QL MICRO: NEGATIVE — SIGNIFICANT CHANGE UP
UROBILINOGEN FLD QL: 1 MG/DL (ref 0.2–0.2)
WBC # BLD: 8.93 K/UL — SIGNIFICANT CHANGE UP (ref 4.8–10.8)
WBC # FLD AUTO: 8.93 K/UL — SIGNIFICANT CHANGE UP (ref 4.8–10.8)
WBC UR QL: SIGNIFICANT CHANGE UP /HPF

## 2020-08-11 LAB
HAV IGM SER-ACNC: SIGNIFICANT CHANGE UP
HBV CORE IGM SER-ACNC: SIGNIFICANT CHANGE UP
HBV SURFACE AG SER-ACNC: SIGNIFICANT CHANGE UP
HCV AB S/CO SERPL IA: 13.12 S/CO — HIGH (ref 0–0.99)
HCV AB SERPL-IMP: REACTIVE
T PALLIDUM AB TITR SER: NEGATIVE — SIGNIFICANT CHANGE UP

## 2020-08-12 LAB
GAMMA INTERFERON BACKGROUND BLD IA-ACNC: 0.04 IU/ML — SIGNIFICANT CHANGE UP
M TB IFN-G BLD-IMP: NEGATIVE — SIGNIFICANT CHANGE UP
M TB IFN-G CD4+ BCKGRND COR BLD-ACNC: 0.2 IU/ML — SIGNIFICANT CHANGE UP
M TB IFN-G CD4+CD8+ BCKGRND COR BLD-ACNC: 0.22 IU/ML — SIGNIFICANT CHANGE UP
QUANT TB PLUS MITOGEN MINUS NIL: 9.67 IU/ML — SIGNIFICANT CHANGE UP

## 2020-08-16 LAB — HCV RNA FLD QL NAA+PROBE: SIGNIFICANT CHANGE UP

## 2020-09-23 ENCOUNTER — OUTPATIENT (OUTPATIENT)
Dept: OUTPATIENT SERVICES | Facility: HOSPITAL | Age: 45
LOS: 1 days | Discharge: HOME | End: 2020-09-23

## 2020-09-23 DIAGNOSIS — I49.9 CARDIAC ARRHYTHMIA, UNSPECIFIED: ICD-10-CM

## 2020-10-17 ENCOUNTER — EMERGENCY (EMERGENCY)
Facility: HOSPITAL | Age: 45
LOS: 0 days | Discharge: HOME | End: 2020-10-17
Attending: EMERGENCY MEDICINE | Admitting: EMERGENCY MEDICINE
Payer: MEDICAID

## 2020-10-17 VITALS
OXYGEN SATURATION: 95 % | SYSTOLIC BLOOD PRESSURE: 132 MMHG | HEART RATE: 76 BPM | RESPIRATION RATE: 18 BRPM | TEMPERATURE: 98 F | WEIGHT: 190.04 LBS | HEIGHT: 72 IN | DIASTOLIC BLOOD PRESSURE: 73 MMHG

## 2020-10-17 DIAGNOSIS — Z48.00 ENCOUNTER FOR CHANGE OR REMOVAL OF NONSURGICAL WOUND DRESSING: ICD-10-CM

## 2020-10-17 DIAGNOSIS — F17.200 NICOTINE DEPENDENCE, UNSPECIFIED, UNCOMPLICATED: ICD-10-CM

## 2020-10-17 DIAGNOSIS — L02.414 CUTANEOUS ABSCESS OF LEFT UPPER LIMB: ICD-10-CM

## 2020-10-17 PROCEDURE — 99283 EMERGENCY DEPT VISIT LOW MDM: CPT | Mod: 25

## 2020-10-17 PROCEDURE — 10060 I&D ABSCESS SIMPLE/SINGLE: CPT

## 2020-10-17 NOTE — ED PROVIDER NOTE - OBJECTIVE STATEMENT
45 year old male with a history of IVDU presenting for abscess on left hand. Pt states he hit the dorsal aspect of his left hand on a wall ~1 week ago and since then developed a bump on this hand. Bump in erythematous and fluctuant; yesterday pt tried to drain it with a clean needle and some pus came out but area is more red and painful today so came to ED. Denies fevers, chest pain, shortness of breath, or any previous episodes of abscess. 45 year old male with a history of IVDU presenting for abscess on left hand. Pt states he hit the dorsal aspect of his left hand on a wall ~1 week ago and since then developed a "bump" on this hand. "Bump" is erythematous and fluctuant; yesterday pt tried to drain it with a clean needle and some pus came out but area is more red and painful today so came to ED. Denies fevers, chest pain, shortness of breath, or any previous episodes of abscess.

## 2020-10-17 NOTE — ED ADULT TRIAGE NOTE - CHIEF COMPLAINT QUOTE
As per patient I accidently hit my left hand against something last week and yesterday I tried to drain the pus out of it but it looks worse

## 2020-10-17 NOTE — ED PROVIDER NOTE - NSFOLLOWUPINSTRUCTIONS_ED_ALL_ED_FT
PLEASE FOLLOW UP WITH YOUR PRIMARY CARE PHYSICIAN IN 24 HOURS. PLEASE TAKE ANTIBIOTIC MEDICATION THAT WAS SENT TO YOUR PHARMACY.     Abscess    An abscess is an infected area that contains a collection of pus and debris. It can occur in almost any part of the body and occurs when the tissue gets infection. Symptoms include a painful mass that is red, warm, tender that might break open and have drainage. If your health care provider gave you antibiotics make sure to take the full course and do not stop even if feeling better.     SEEK MEDICAL CARE IF YOU HAVE THE FOLLOWING SYMPTOMS: chills, fever, muscle aches, or red streaking from the area.

## 2020-10-17 NOTE — ED PROVIDER NOTE - CLINICAL SUMMARY MEDICAL DECISION MAKING FREE TEXT BOX
patient has no fevers no chills and no streaking redness s/p i/d I will initiate po antibiotics and follow with pmd   he has no kanaval signs noted

## 2020-10-17 NOTE — ED PROVIDER NOTE - CARE PROVIDER_API CALL
continue with current nutritional content of PN Lalo Hernandez [facility]  [address] [place] [address]  Phone: [phone]  Fax: [phone]  Established Patient  Follow Up Time: Urgent

## 2020-10-17 NOTE — ED PROVIDER NOTE - PATIENT PORTAL LINK FT
You can access the FollowMyHealth Patient Portal offered by [facility] by registering at the following website: [email or web address]. By joining [employer]’s FollowMyHealth portal, you will also be able to view your health information using other applications (apps) compatible with our system.

## 2020-10-17 NOTE — ED PROVIDER NOTE - ATTENDING CONTRIBUTION TO CARE
I was present for and supervised the key and critical aspects of the procedures performed during the care of the patient. Patient presents for evaluation of abscess noted not dorsal aspect of left hand after an hitting it against a wall more than 1 week ago with no fevers no chills no ascending redness noted. radial pulses 2 +=   no streaking redness noted   a/p- patient had successful drainage of i/d I will initiate po antibiotics patient has no fevers no chills no murmurs noted

## 2020-10-17 NOTE — ED PROVIDER NOTE - NS ED ROS FT
Eyes:  No visual changes, eye pain or discharge.  ENMT:  No hearing changes, pain, discharge or infections. No neck pain or stiffness.  Cardiac:  No chest pain, SOB or edema. No chest pain with exertion.  Respiratory:  No cough or respiratory distress. No hemoptysis.   GI:  No nausea, vomiting, diarrhea or abdominal pain.  :  No dysuria, frequency or burning.  MS:  No myalgia, muscle weakness, +left hand abscess  Neuro:  No headache or weakness.  No LOC.  Skin:  +abscess on left hand   Endocrine: No history of thyroid disease or diabetes.

## 2020-10-17 NOTE — ED PROVIDER NOTE - PHYSICAL EXAMINATION
CONSTITUTIONAL: Well-developed; well-nourished; in no acute distress.   SKIN: warm, dry +3cm x 2cm abscess on dorsal aspect of left hand with fluctuance and surrounding area of erythema; no streaking.   HEAD: Normocephalic; atraumatic.  EYES: normal sclera and conjunctiva   ENT: No nasal discharge; airway clear.  NECK: Supple; non tender.  CARD: S1, S2 normal; no murmurs, gallops, or rubs. Regular rate and rhythm.   RESP: No wheezes, rales or rhonchi.  ABD: soft ntnd  EXT: Normal ROM.  +left hand abscess. b/l radial pulses 2+ b/l. full ROM. normal strength and sensation of b/l upper extremities.   LYMPH: No acute cervical adenopathy.  NEURO: Alert, oriented, grossly unremarkable  PSYCH: Cooperative, appropriate.

## 2021-01-07 ENCOUNTER — EMERGENCY (EMERGENCY)
Facility: HOSPITAL | Age: 46
LOS: 0 days | Discharge: HOME | End: 2021-01-07
Attending: EMERGENCY MEDICINE | Admitting: EMERGENCY MEDICINE
Payer: MEDICAID

## 2021-01-07 VITALS
WEIGHT: 184.97 LBS | RESPIRATION RATE: 20 BRPM | DIASTOLIC BLOOD PRESSURE: 105 MMHG | HEART RATE: 86 BPM | TEMPERATURE: 97 F | SYSTOLIC BLOOD PRESSURE: 161 MMHG | OXYGEN SATURATION: 95 % | HEIGHT: 72 IN
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
## 2021-01-07 DIAGNOSIS — F17.200 NICOTINE DEPENDENCE, UNSPECIFIED, UNCOMPLICATED: ICD-10-CM

## 2021-01-07 DIAGNOSIS — T15.02XA FOREIGN BODY IN CORNEA, LEFT EYE, INITIAL ENCOUNTER: ICD-10-CM
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
## 2021-01-07 DIAGNOSIS — H57.10 OCULAR PAIN, UNSPECIFIED EYE: ICD-10-CM

## 2021-01-07 PROCEDURE — 99283 EMERGENCY DEPT VISIT LOW MDM: CPT | Mod: 25

## 2021-01-07 PROCEDURE — 65220 REMOVE FOREIGN BODY FROM EYE: CPT

## 2021-01-07 RX ORDER — POLYMYXIN B SULF/TRIMETHOPRIM 10000-1/ML
1 DROPS OPHTHALMIC (EYE)
Qty: 10 | Refills: 0
Start: 2021-01-07 | End: 2021-01-12

## 2021-01-07 NOTE — ED PROVIDER NOTE - PATIENT PORTAL LINK FT
You can access the FollowMyHealth Patient Portal offered by [facility] by registering at the following website: [email or web address]. By joining [employer]’s FollowMyHealth portal, you will also be able to view your health information using other applications (apps) compatible with our system.

## 2021-01-07 NOTE — ED PROVIDER NOTE - NSFOLLOWUPINSTRUCTIONS_ED_ALL_ED_FT
Eye Foreign Body    WHAT YOU NEED TO KNOW:    You may have pain, sensitivity to light, or blurry vision for a few days.     DISCHARGE INSTRUCTIONS:    Return to the emergency department if:     You suddenly lose your vision.       You have severe eye pain.     Contact your healthcare provider or ophthalmologist if:     You have new or worse eye swelling.       Your symptoms do not get better, even after the foreign body is removed.       You have white or yellow fluid draining from your eye.       You have questions or concerns about your condition or care.     Medicines: You may need any of the following:     Eye drops or eye ointment may be given to prevent an infection and decrease pain.       NSAIDs, such as ibuprofen, help decrease swelling, pain, and fever. NSAIDs can cause stomach bleeding or kidney problems in certain people. If you take blood thinner medicine, always ask your healthcare provider if NSAIDs are safe for you. Always read the medicine label and follow directions.      Prescription pain medicine may be given. Ask your healthcare provider how to take this medicine safely. Some prescription pain medicines contain acetaminophen. Do not take other medicines that contain acetaminophen without talking to your healthcare provider. Too much acetaminophen may cause liver damage. Prescription pain medicine may cause constipation. Ask your healthcare provider how to prevent or treat constipation.       Take your medicine as directed. Contact your healthcare provider if you think your medicine is not helping or if you have side effects. Tell him of her if you are allergic to any medicine. Keep a list of the medicines, vitamins, and herbs you take. Include the amounts, and when and why you take them. Bring the list or the pill bottles to follow-up visits. Carry your medicine list with you in case of an emergency.    Help your eye heal:     Do not rub your eye. This may cause more damage or infection.       Do not wear your contacts lenses until your eye heals. Ask your healthcare provider how long to follow this direction.       Wear sunglasses as directed. Sunglasses help protect the eye and decrease sensitivity to light.     Prevent another EFB:     Wear safety glasses, eye shields, or goggles. These items can prevent eye injury. Make sure the eyewear wraps around the sides of your face. Wear these items while you work with chemicals, metal, wood, or bodily fluids such as blood. Also wear protective eyewear during sports such as racquetball or swimming. Do not use regular eye glasses for eye protection. They will not protect your eyes from foreign bodies or chemicals.       Use contact lenses as directed. Wash your hands before you clean, insert, or remove your contacts. Insert and remove contact lenses correctly. Clean and change your contacts as directed to help prevent eye damage or infection.     Follow up with your healthcare provider or ophthalmologist in 1 to 2 days: Write down your questions so you remember to ask them during your visits.       © Copyright [employer] 2019 All illustrations and images included in CareNotes are the copyrighted property of [employer] Inc[employer] or [employer].

## 2021-01-07 NOTE — ED PROVIDER NOTE - CLINICAL SUMMARY MEDICAL DECISION MAKING FREE TEXT BOX
Patient found to have fb noted with successful removal of left eye patient had no flouro uptake after removal I will discharge with ocular abx instructed to follow with eye clinic

## 2021-01-07 NOTE — ED PROVIDER NOTE - ATTENDING CONTRIBUTION TO CARE
I was present for and supervised the key and critical aspects of the procedures performed during the care of the patient. Patient presents fore valuation of eye pain after doing yardwork specifically chopping wood 1 day prior he has no history of contact lens use he has no fevers or chills no visual loss   we successfully removed fb at 9:00 position I will discharge at this time with follow up to optho I was present for and supervised the key and critical aspects of the procedures performed during the care of the patient. Patient presents fore valuation of eye pain after doing yardwork specifically chopping wood 1 day prior he has no history of contact lens use he has no fevers or chills no visual loss   we successfully removed fb at 9:00 position I will discharge at this time with follow up to optho.

## 2021-01-07 NOTE — ED PROVIDER NOTE - OBJECTIVE STATEMENT
44 yo M c/o FB to left eye. Patient was cutting wood and something flew in his left eye yesterday. +pain. No visual changes. Tetanus UTD.

## 2021-01-07 NOTE — ED PROVIDER NOTE - NSFOLLOWUPCLINICS_GEN_ALL_ED_FT
[facility]  Ophthalmolgy  [address], [address]  [place] [address]  Phone: [phone]  Fax:   Follow Up Time:

## 2021-06-28 ENCOUNTER — OUTPATIENT (OUTPATIENT)
Dept: OUTPATIENT SERVICES | Facility: HOSPITAL | Age: 46
LOS: 1 days | Discharge: HOME | End: 2021-06-28

## 2021-06-28 DIAGNOSIS — Z00.8 ENCOUNTER FOR OTHER GENERAL EXAMINATION: ICD-10-CM

## 2021-07-07 LAB
A1C WITH ESTIMATED AVERAGE GLUCOSE RESULT: 5.6 % — SIGNIFICANT CHANGE UP (ref 4–5.6)
ALBUMIN SERPL ELPH-MCNC: 4.1 G/DL — SIGNIFICANT CHANGE UP (ref 3.5–5.2)
ALP SERPL-CCNC: 77 U/L — SIGNIFICANT CHANGE UP (ref 30–115)
ALT FLD-CCNC: 11 U/L — SIGNIFICANT CHANGE UP (ref 0–41)
ANION GAP SERPL CALC-SCNC: 11 MMOL/L — SIGNIFICANT CHANGE UP (ref 7–14)
APPEARANCE UR: CLEAR — SIGNIFICANT CHANGE UP
AST SERPL-CCNC: 15 U/L — SIGNIFICANT CHANGE UP (ref 0–41)
BACTERIA # UR AUTO: ABNORMAL
BILIRUB SERPL-MCNC: 0.3 MG/DL — SIGNIFICANT CHANGE UP (ref 0.2–1.2)
BILIRUB UR-MCNC: ABNORMAL
BUN SERPL-MCNC: 29 MG/DL — HIGH (ref 10–20)
CALCIUM SERPL-MCNC: 9.8 MG/DL — SIGNIFICANT CHANGE UP (ref 8.5–10.1)
CHLORIDE SERPL-SCNC: 100 MMOL/L — SIGNIFICANT CHANGE UP (ref 98–110)
CHOLEST SERPL-MCNC: 168 MG/DL — SIGNIFICANT CHANGE UP
CO2 SERPL-SCNC: 26 MMOL/L — SIGNIFICANT CHANGE UP (ref 17–32)
COD CRY URNS QL: NEGATIVE — SIGNIFICANT CHANGE UP
COLOR SPEC: YELLOW — SIGNIFICANT CHANGE UP
CREAT SERPL-MCNC: 1.3 MG/DL — SIGNIFICANT CHANGE UP (ref 0.7–1.5)
DIFF PNL FLD: NEGATIVE — SIGNIFICANT CHANGE UP
EPI CELLS # UR: ABNORMAL /HPF
ESTIMATED AVERAGE GLUCOSE: 114 MG/DL — SIGNIFICANT CHANGE UP (ref 68–114)
GLUCOSE SERPL-MCNC: 113 MG/DL — HIGH (ref 70–99)
GLUCOSE UR QL: NEGATIVE MG/DL — SIGNIFICANT CHANGE UP
GRAN CASTS # UR COMP ASSIST: NEGATIVE — SIGNIFICANT CHANGE UP
HCT VFR BLD CALC: 40.8 % — LOW (ref 42–52)
HDLC SERPL-MCNC: 42 MG/DL — SIGNIFICANT CHANGE UP
HGB BLD-MCNC: 13.8 G/DL — LOW (ref 14–18)
HYALINE CASTS # UR AUTO: NEGATIVE — SIGNIFICANT CHANGE UP
KETONES UR-MCNC: NEGATIVE — SIGNIFICANT CHANGE UP
LEUKOCYTE ESTERASE UR-ACNC: NEGATIVE — SIGNIFICANT CHANGE UP
LIPID PNL WITH DIRECT LDL SERPL: 109 MG/DL — HIGH
MAGNESIUM SERPL-MCNC: 2 MG/DL — SIGNIFICANT CHANGE UP (ref 1.8–2.4)
MCHC RBC-ENTMCNC: 29.8 PG — SIGNIFICANT CHANGE UP (ref 27–31)
MCHC RBC-ENTMCNC: 33.8 G/DL — SIGNIFICANT CHANGE UP (ref 32–37)
MCV RBC AUTO: 88.1 FL — SIGNIFICANT CHANGE UP (ref 80–94)
NITRITE UR-MCNC: NEGATIVE — SIGNIFICANT CHANGE UP
NON HDL CHOLESTEROL: 126 MG/DL — SIGNIFICANT CHANGE UP
NRBC # BLD: 0 /100 WBCS — SIGNIFICANT CHANGE UP (ref 0–0)
PH UR: 5.5 — SIGNIFICANT CHANGE UP (ref 5–8)
PLATELET # BLD AUTO: 260 K/UL — SIGNIFICANT CHANGE UP (ref 130–400)
POTASSIUM SERPL-MCNC: 4.4 MMOL/L — SIGNIFICANT CHANGE UP (ref 3.5–5)
POTASSIUM SERPL-SCNC: 4.4 MMOL/L — SIGNIFICANT CHANGE UP (ref 3.5–5)
PROT SERPL-MCNC: 7.1 G/DL — SIGNIFICANT CHANGE UP (ref 6–8)
PROT UR-MCNC: 30 MG/DL
RBC # BLD: 4.63 M/UL — LOW (ref 4.7–6.1)
RBC # FLD: 12.7 % — SIGNIFICANT CHANGE UP (ref 11.5–14.5)
RBC CASTS # UR COMP ASSIST: NEGATIVE — SIGNIFICANT CHANGE UP
SODIUM SERPL-SCNC: 137 MMOL/L — SIGNIFICANT CHANGE UP (ref 135–146)
SP GR SPEC: >=1.03 (ref 1.01–1.03)
TRI-PHOS CRY UR QL COMP ASSIST: NEGATIVE — SIGNIFICANT CHANGE UP
TRIGL SERPL-MCNC: 91 MG/DL — SIGNIFICANT CHANGE UP
URATE CRY FLD QL MICRO: NEGATIVE — SIGNIFICANT CHANGE UP
UROBILINOGEN FLD QL: 0.2 MG/DL — SIGNIFICANT CHANGE UP (ref 0.2–0.2)
WBC # BLD: 8.63 K/UL — SIGNIFICANT CHANGE UP (ref 4.8–10.8)
WBC # FLD AUTO: 8.63 K/UL — SIGNIFICANT CHANGE UP (ref 4.8–10.8)
WBC UR QL: SIGNIFICANT CHANGE UP /HPF

## 2021-07-08 LAB
GAMMA INTERFERON BACKGROUND BLD IA-ACNC: 0.03 IU/ML — SIGNIFICANT CHANGE UP
HAV IGM SER-ACNC: SIGNIFICANT CHANGE UP
HBV CORE IGM SER-ACNC: SIGNIFICANT CHANGE UP
HBV SURFACE AG SER-ACNC: SIGNIFICANT CHANGE UP
HCV AB S/CO SERPL IA: 13.09 S/CO — HIGH (ref 0–0.99)
HCV AB SERPL-IMP: REACTIVE
M TB IFN-G BLD-IMP: NEGATIVE — SIGNIFICANT CHANGE UP
M TB IFN-G CD4+ BCKGRND COR BLD-ACNC: 0.24 IU/ML — SIGNIFICANT CHANGE UP
M TB IFN-G CD4+CD8+ BCKGRND COR BLD-ACNC: 0.25 IU/ML — SIGNIFICANT CHANGE UP
QUANT TB PLUS MITOGEN MINUS NIL: 7.12 IU/ML — SIGNIFICANT CHANGE UP
T PALLIDUM AB TITR SER: NEGATIVE — SIGNIFICANT CHANGE UP

## 2021-07-10 LAB — HCV RNA FLD QL NAA+PROBE: SIGNIFICANT CHANGE UP

## 2021-07-20 ENCOUNTER — OUTPATIENT (OUTPATIENT)
Dept: OUTPATIENT SERVICES | Facility: HOSPITAL | Age: 46
LOS: 1 days | Discharge: HOME | End: 2021-07-20
Payer: COMMERCIAL

## 2021-07-20 DIAGNOSIS — I49.9 CARDIAC ARRHYTHMIA, UNSPECIFIED: ICD-10-CM

## 2021-07-20 PROCEDURE — 93010 ELECTROCARDIOGRAM REPORT: CPT | Mod: NC

## 2021-08-02 ENCOUNTER — EMERGENCY (EMERGENCY)
Facility: HOSPITAL | Age: 46
LOS: 0 days | Discharge: HOME | End: 2021-08-02
Attending: EMERGENCY MEDICINE | Admitting: EMERGENCY MEDICINE
Payer: MEDICAID

## 2021-08-02 VITALS
WEIGHT: 179.9 LBS | OXYGEN SATURATION: 100 % | DIASTOLIC BLOOD PRESSURE: 94 MMHG | RESPIRATION RATE: 18 BRPM | HEART RATE: 90 BPM | SYSTOLIC BLOOD PRESSURE: 111 MMHG | HEIGHT: 72 IN | TEMPERATURE: 98 F

## 2021-08-02 DIAGNOSIS — Z79.899 OTHER LONG TERM (CURRENT) DRUG THERAPY: ICD-10-CM

## 2021-08-02 DIAGNOSIS — F17.200 NICOTINE DEPENDENCE, UNSPECIFIED, UNCOMPLICATED: ICD-10-CM

## 2021-08-02 DIAGNOSIS — L02.413 CUTANEOUS ABSCESS OF RIGHT UPPER LIMB: ICD-10-CM

## 2021-08-02 DIAGNOSIS — Z86.59 PERSONAL HISTORY OF OTHER MENTAL AND BEHAVIORAL DISORDERS: ICD-10-CM

## 2021-08-02 PROCEDURE — 99284 EMERGENCY DEPT VISIT MOD MDM: CPT | Mod: 25

## 2021-08-02 PROCEDURE — 76882 US LMTD JT/FCL EVL NVASC XTR: CPT | Mod: 26

## 2021-08-02 RX ORDER — AZTREONAM 2 G
1 VIAL (EA) INJECTION
Qty: 20 | Refills: 0
Start: 2021-08-02 | End: 2021-08-11

## 2021-08-02 NOTE — ED PROCEDURE NOTE - PROCEDURE ADDITIONAL DETAILS
Ultrasound performed again post incision and drainage and shows resolution of swelling/abscess.  I was physically present and helped performed this Ultrasound.  I supervised all views obtained and reviewed images in real time. I agree with findings documented. Results of Ultrasound discussed with patient.

## 2021-08-02 NOTE — ED PROVIDER NOTE - OBJECTIVE STATEMENT
this is a 44 yo male presents to ed for evaluation of right AC . paticrisn injected heroine into that arm one week ago and then for past for 4 days he noticed swelling

## 2021-08-02 NOTE — ED PROVIDER NOTE - NSFOLLOWUPINSTRUCTIONS_ED_ALL_ED_FT
Please try to avoid using IV drugs. Complete antibiotic course and monitor symptoms.     WHAT YOU NEED TO KNOW:  An abscess incision and drainage (I and D) is a procedure to drain pus from an abscess and clean it out so it can heal.    DISCHARGE INSTRUCTIONS:  Contact your healthcare provider if:  The area around your abscess has red streaks or is warm and painful.  You have a fever or chills.  You have increased redness, swelling, or pain in your wound.  Your wound does not start to heal after a few days.  Your abscess returns.  You have questions or concerns about your condition or care.  Medicines:  NSAIDs , such as ibuprofen, help decrease swelling, pain, and fever. NSAIDs can cause stomach bleeding or kidney problems in certain people. If you take blood thinner medicine, always ask your healthcare provider if NSAIDs are safe for you. Always read the medicine label and follow directions.  Take your medicine as directed. Contact your healthcare provider if you think your medicine is not helping or if you have side effects. Tell him or her if you are allergic to any medicine. Keep a list of the medicines, vitamins, and herbs you take. Include the amounts, and when and why you take them. Bring the list or the pill bottles to follow-up visits. Carry your medicine list with you in case of an emergency.  Care for your wound as directed:  Do not remove your bandage unless your healthcare provider says it is okay. Keep the bandage clean and dry. Remove your bandage and clean the wound once your healthcare provider gives you directions.  Apply heat on the bandage over your wound for 20 to 30 minutes every 2 hours for as many days as directed. This will increase blood flow to the area and help it heal.  Elevate your wound above level of your heart as often as you can. This will help decrease swelling and pain. Prop your wounded area on pillows or blankets to keep it elevated comfortably.  Follow up with your healthcare provider as directed:  You may need to return in 1 to 3 days to have the gauze in your wound removed and your wound examined. You may be taught how to change the gauze in your wound. Write down your questions so you remember to ask them during your visits.

## 2021-08-02 NOTE — ED PROVIDER NOTE - PATIENT PORTAL LINK FT
You can access the FollowMyHealth Patient Portal offered by [facility] by registering at the following website: [email or web address]. By joining [employer]’s FollowMyHealth portal, you will also be able to view your health information using other applications (apps) compatible with our system.

## 2021-08-02 NOTE — ED PROVIDER NOTE - PHYSICAL EXAMINATION
--EXAM--  VITAL SIGNS: I have reviewed vs documented at present.  CONSTITUTIONAL: Well-developed; well-nourished; in no acute distress.   SKIN: right arm AC there is area of induration , tenderness erythema and fluctuance     CARD: S1, S2, Regular rate and rhythm.   RESP: No wheezes, rales or rhonchi.

## 2021-08-02 NOTE — ED PROVIDER NOTE - CLINICAL SUMMARY MEDICAL DECISION MAKING FREE TEXT BOX
Patient n/v intact with Full ROM and full motor strength. + Soft tissue swelling and ultrasound consistent with abscess. I+D performed. Will DC on Bactrim/Augmentin with close follow up and instructions. Drug counseling given.    Full DC instructions discussed and patient knows when to seek immediate medical attention.  Patient has proper follow up.  All results discussed and patient aware they may require further work up.  Proper follow up ensured. Limitations of ED work up discussed.  Medications administered and prescribed/OTC home meds discussed.  All questions and concerns from patient or family addressed. Understanding of instructions verbalized.

## 2021-08-02 NOTE — ED PROVIDER NOTE - ATTENDING CONTRIBUTION TO CARE
I personally evaluated patient. I agree with the findings and plan with all documentation on chart except as documented  in my note.    Patient n/v intact with Full ROM and full motor strength. + Soft tissue swelling and ultrasound consistent with abscess. I+D performed. Will DC on Bactrim/Augmentin with close follow up and instructions. Drug counseling given.    Full DC instructions discussed and patient knows when to seek immediate medical attention.  Patient has proper follow up.  All results discussed and patient aware they may require further work up.  Proper follow up ensured. Limitations of ED work up discussed.  Medications administered and prescribed/OTC home meds discussed.  All questions and concerns from patient or family addressed. Understanding of instructions verbalized.

## 2021-09-15 ENCOUNTER — EMERGENCY (EMERGENCY)
Facility: HOSPITAL | Age: 46
LOS: 0 days | Discharge: HOME | End: 2021-09-15
Attending: EMERGENCY MEDICINE | Admitting: EMERGENCY MEDICINE
Payer: MEDICAID

## 2021-09-15 VITALS
TEMPERATURE: 97 F | OXYGEN SATURATION: 99 % | HEIGHT: 72 IN | SYSTOLIC BLOOD PRESSURE: 111 MMHG | WEIGHT: 179.9 LBS | DIASTOLIC BLOOD PRESSURE: 65 MMHG | RESPIRATION RATE: 18 BRPM | HEART RATE: 101 BPM

## 2021-09-15 DIAGNOSIS — R42 DIZZINESS AND GIDDINESS: ICD-10-CM

## 2021-09-15 DIAGNOSIS — R11.0 NAUSEA: ICD-10-CM

## 2021-09-15 DIAGNOSIS — R68.83 CHILLS (WITHOUT FEVER): ICD-10-CM

## 2021-09-15 DIAGNOSIS — Z87.898 PERSONAL HISTORY OF OTHER SPECIFIED CONDITIONS: ICD-10-CM

## 2021-09-15 DIAGNOSIS — Z86.59 PERSONAL HISTORY OF OTHER MENTAL AND BEHAVIORAL DISORDERS: ICD-10-CM

## 2021-09-15 DIAGNOSIS — Z79.899 OTHER LONG TERM (CURRENT) DRUG THERAPY: ICD-10-CM

## 2021-09-15 DIAGNOSIS — Z20.822 CONTACT WITH AND (SUSPECTED) EXPOSURE TO COVID-19: ICD-10-CM

## 2021-09-15 PROBLEM — F19.10 OTHER PSYCHOACTIVE SUBSTANCE ABUSE, UNCOMPLICATED: Chronic | Status: ACTIVE | Noted: 2021-08-02

## 2021-09-15 LAB
ALBUMIN SERPL ELPH-MCNC: 4 G/DL — SIGNIFICANT CHANGE UP (ref 3.5–5.2)
ALP SERPL-CCNC: 121 U/L — HIGH (ref 30–115)
ALT FLD-CCNC: 14 U/L — SIGNIFICANT CHANGE UP (ref 0–41)
ANION GAP SERPL CALC-SCNC: 13 MMOL/L — SIGNIFICANT CHANGE UP (ref 7–14)
APPEARANCE UR: CLEAR — SIGNIFICANT CHANGE UP
AST SERPL-CCNC: 19 U/L — SIGNIFICANT CHANGE UP (ref 0–41)
BACTERIA # UR AUTO: ABNORMAL
BASOPHILS # BLD AUTO: 0.02 K/UL — SIGNIFICANT CHANGE UP (ref 0–0.2)
BASOPHILS NFR BLD AUTO: 0.3 % — SIGNIFICANT CHANGE UP (ref 0–1)
BILIRUB DIRECT SERPL-MCNC: 0.4 MG/DL — HIGH (ref 0–0.2)
BILIRUB INDIRECT FLD-MCNC: 0.7 MG/DL — SIGNIFICANT CHANGE UP (ref 0.2–1.2)
BILIRUB SERPL-MCNC: 1.1 MG/DL — SIGNIFICANT CHANGE UP (ref 0.2–1.2)
BILIRUB UR-MCNC: ABNORMAL
BUN SERPL-MCNC: 12 MG/DL — SIGNIFICANT CHANGE UP (ref 10–20)
CALCIUM SERPL-MCNC: 9.6 MG/DL — SIGNIFICANT CHANGE UP (ref 8.5–10.1)
CHLORIDE SERPL-SCNC: 99 MMOL/L — SIGNIFICANT CHANGE UP (ref 98–110)
CO2 SERPL-SCNC: 24 MMOL/L — SIGNIFICANT CHANGE UP (ref 17–32)
COLOR SPEC: YELLOW — SIGNIFICANT CHANGE UP
COMMENT - URINE: SIGNIFICANT CHANGE UP
CREAT SERPL-MCNC: 1.3 MG/DL — SIGNIFICANT CHANGE UP (ref 0.7–1.5)
DIFF PNL FLD: NEGATIVE — SIGNIFICANT CHANGE UP
EOSINOPHIL # BLD AUTO: 0.04 K/UL — SIGNIFICANT CHANGE UP (ref 0–0.7)
EOSINOPHIL NFR BLD AUTO: 0.5 % — SIGNIFICANT CHANGE UP (ref 0–8)
EPI CELLS # UR: ABNORMAL /HPF
GLUCOSE SERPL-MCNC: 108 MG/DL — HIGH (ref 70–99)
GLUCOSE UR QL: NEGATIVE MG/DL — SIGNIFICANT CHANGE UP
HCT VFR BLD CALC: 42.2 % — SIGNIFICANT CHANGE UP (ref 42–52)
HGB BLD-MCNC: 14.1 G/DL — SIGNIFICANT CHANGE UP (ref 14–18)
IMM GRANULOCYTES NFR BLD AUTO: 0.1 % — SIGNIFICANT CHANGE UP (ref 0.1–0.3)
KETONES UR-MCNC: ABNORMAL
LACTATE SERPL-SCNC: 1.8 MMOL/L — SIGNIFICANT CHANGE UP (ref 0.7–2)
LEUKOCYTE ESTERASE UR-ACNC: NEGATIVE — SIGNIFICANT CHANGE UP
LIDOCAIN IGE QN: 9 U/L — SIGNIFICANT CHANGE UP (ref 7–60)
LYMPHOCYTES # BLD AUTO: 0.65 K/UL — LOW (ref 1.2–3.4)
LYMPHOCYTES # BLD AUTO: 8.7 % — LOW (ref 20.5–51.1)
MCHC RBC-ENTMCNC: 29.4 PG — SIGNIFICANT CHANGE UP (ref 27–31)
MCHC RBC-ENTMCNC: 33.4 G/DL — SIGNIFICANT CHANGE UP (ref 32–37)
MCV RBC AUTO: 88.1 FL — SIGNIFICANT CHANGE UP (ref 80–94)
MONOCYTES # BLD AUTO: 0.09 K/UL — LOW (ref 0.1–0.6)
MONOCYTES NFR BLD AUTO: 1.2 % — LOW (ref 1.7–9.3)
NEUTROPHILS # BLD AUTO: 6.68 K/UL — HIGH (ref 1.4–6.5)
NEUTROPHILS NFR BLD AUTO: 89.2 % — HIGH (ref 42.2–75.2)
NITRITE UR-MCNC: NEGATIVE — SIGNIFICANT CHANGE UP
NRBC # BLD: 0 /100 WBCS — SIGNIFICANT CHANGE UP (ref 0–0)
PH UR: 6 — SIGNIFICANT CHANGE UP (ref 5–8)
PLATELET # BLD AUTO: 211 K/UL — SIGNIFICANT CHANGE UP (ref 130–400)
POTASSIUM SERPL-MCNC: 4.3 MMOL/L — SIGNIFICANT CHANGE UP (ref 3.5–5)
POTASSIUM SERPL-SCNC: 4.3 MMOL/L — SIGNIFICANT CHANGE UP (ref 3.5–5)
PROT SERPL-MCNC: 7.2 G/DL — SIGNIFICANT CHANGE UP (ref 6–8)
PROT UR-MCNC: 30 MG/DL
RAPID RVP RESULT: SIGNIFICANT CHANGE UP
RBC # BLD: 4.79 M/UL — SIGNIFICANT CHANGE UP (ref 4.7–6.1)
RBC # FLD: 13.5 % — SIGNIFICANT CHANGE UP (ref 11.5–14.5)
RBC CASTS # UR COMP ASSIST: SIGNIFICANT CHANGE UP /HPF
SARS-COV-2 RNA SPEC QL NAA+PROBE: SIGNIFICANT CHANGE UP
SODIUM SERPL-SCNC: 136 MMOL/L — SIGNIFICANT CHANGE UP (ref 135–146)
SP GR SPEC: 1.02 — SIGNIFICANT CHANGE UP (ref 1.01–1.03)
TROPONIN T SERPL-MCNC: <0.01 NG/ML — SIGNIFICANT CHANGE UP
UROBILINOGEN FLD QL: 2 MG/DL
WBC # BLD: 7.49 K/UL — SIGNIFICANT CHANGE UP (ref 4.8–10.8)
WBC # FLD AUTO: 7.49 K/UL — SIGNIFICANT CHANGE UP (ref 4.8–10.8)
WBC UR QL: SIGNIFICANT CHANGE UP /HPF

## 2021-09-15 PROCEDURE — 93010 ELECTROCARDIOGRAM REPORT: CPT | Mod: 76

## 2021-09-15 PROCEDURE — 99285 EMERGENCY DEPT VISIT HI MDM: CPT

## 2021-09-15 PROCEDURE — 71045 X-RAY EXAM CHEST 1 VIEW: CPT | Mod: 26

## 2021-09-15 RX ORDER — SODIUM CHLORIDE 9 MG/ML
2000 INJECTION INTRAMUSCULAR; INTRAVENOUS; SUBCUTANEOUS ONCE
Refills: 0 | Status: COMPLETED | OUTPATIENT
Start: 2021-09-15 | End: 2021-09-15

## 2021-09-15 RX ORDER — ONDANSETRON 8 MG/1
4 TABLET, FILM COATED ORAL ONCE
Refills: 0 | Status: COMPLETED | OUTPATIENT
Start: 2021-09-15 | End: 2021-09-15

## 2021-09-15 RX ADMIN — ONDANSETRON 4 MILLIGRAM(S): 8 TABLET, FILM COATED ORAL at 07:51

## 2021-09-15 RX ADMIN — SODIUM CHLORIDE 2000 MILLILITER(S): 9 INJECTION INTRAMUSCULAR; INTRAVENOUS; SUBCUTANEOUS at 07:49

## 2021-09-15 NOTE — ED PROVIDER NOTE - NSFOLLOWUPCLINICS_GEN_ALL_ED_FT
[facility]  Medicine  [address], [place]   Phone: [phone]  Fax:

## 2021-09-15 NOTE — ED PROVIDER NOTE - NS ED ROS FT
Eyes:  No visual changes, eye pain or discharge.  ENMT:  No hearing changes, pain, discharge or infections. No neck pain or stiffness.  Cardiac:  No chest pain, SOB or edema  Respiratory:  No cough or respiratory distress. No hemoptysis. No history of asthma or RAD.  GI:  + nausea No  vomiting, diarrhea or abdominal pain.  :  No dysuria, frequency or burning.  MS:  No myalgia, muscle weakness, joint pain or back pain.  Neuro:  No headache or weakness.  No LOC.  Skin:  No skin rash.   Endocrine: No history of thyroid disease or diabetes.  Except as documented in the HPI,  all other systems are negative.

## 2021-09-15 NOTE — ED PROVIDER NOTE - ATTENDING CONTRIBUTION TO CARE
I was present for and supervised the key and critical aspects of the procedures performed during the care of the patient. patient presents for evaluation of nausea and chills onset over the past 24 hours. He denies any fevers. He has history  Opioid abuse on Methadone, last used heroin by injection 3 days ago presents today fore eval of nausea, chills, and lightheadedness. Pt denies CP, SOB, Cough, dysuria, abd pain, V/D.  He has no evidence of cellultis   on exam the patient is nc/at perrla eomi oropharynx clear cta b/l, rrr s1s2 noted no murmurs noted abd-soft nt nd bs+ est from with no edema patient is able to ambulate   a/p- given iv fluids we obtained labs I will continue to monitor at this time

## 2021-09-15 NOTE — ED PROVIDER NOTE - PATIENT PORTAL LINK FT
You can access the FollowMyHealth Patient Portal offered by [facility] by registering at the following website: [email or web address]. By joining [employer]’s FollowMyHealth portal, you will also be able to view your health information using other applications (apps) compatible with our system.

## 2021-09-15 NOTE — ED PROVIDER NOTE - CLINICAL SUMMARY MEDICAL DECISION MAKING FREE TEXT BOX
patient improved he is afebrile at this time on exam patient does not have evidence of murmurs radial pulses 2 + =, pedal pulses 2 +=   he is non-toxic in appearance.

## 2021-09-15 NOTE — ED PROVIDER NOTE - OBJECTIVE STATEMENT
Pt is a 47y/o male with a pmhx of Opioid abuse on Methadone, last used heroin by injection 3 days ago presents today fore eval of nausea, chills, and lightheadedness that began around 530 this morning. Pt denies CP, SOB, Cough, dysuria, abd pain, V/D.

## 2021-09-15 NOTE — ED ADULT NURSE NOTE - CAS ELECT INFOMATION PROVIDED
PT was not at bedisde for D/C eloped from unit. IV Cath found @ bedside. No longer on unit./DC instructions

## 2021-09-15 NOTE — ED ADULT TRIAGE NOTE - CHIEF COMPLAINT QUOTE
pt complaining of dizziness and nausea starting at 0530 this morning, denies loc, denies blood thinners, LKW 0530

## 2021-09-16 LAB
CULTURE RESULTS: SIGNIFICANT CHANGE UP
SPECIMEN SOURCE: SIGNIFICANT CHANGE UP

## 2021-09-20 LAB
CULTURE RESULTS: SIGNIFICANT CHANGE UP
CULTURE RESULTS: SIGNIFICANT CHANGE UP
SPECIMEN SOURCE: SIGNIFICANT CHANGE UP
SPECIMEN SOURCE: SIGNIFICANT CHANGE UP

## 2022-09-28 ENCOUNTER — INPATIENT (INPATIENT)
Facility: HOSPITAL | Age: 47
LOS: 2 days | Discharge: HOME | End: 2022-10-01
Attending: STUDENT IN AN ORGANIZED HEALTH CARE EDUCATION/TRAINING PROGRAM | Admitting: STUDENT IN AN ORGANIZED HEALTH CARE EDUCATION/TRAINING PROGRAM

## 2022-09-28 VITALS
WEIGHT: 175.05 LBS | DIASTOLIC BLOOD PRESSURE: 61 MMHG | OXYGEN SATURATION: 93 % | RESPIRATION RATE: 20 BRPM | HEART RATE: 89 BPM | SYSTOLIC BLOOD PRESSURE: 122 MMHG | HEIGHT: 72 IN | TEMPERATURE: 98 F

## 2022-09-28 PROCEDURE — 99285 EMERGENCY DEPT VISIT HI MDM: CPT

## 2022-09-28 NOTE — ED ADULT TRIAGE NOTE - CHIEF COMPLAINT QUOTE
BIBA with complaints of overdose 1 bag of heroine BIBA with complaints of overdose on  heroine ( 1 bag). Pt awake in triage

## 2022-09-29 LAB
ALBUMIN SERPL ELPH-MCNC: 4.2 G/DL — SIGNIFICANT CHANGE UP (ref 3.5–5.2)
ALP SERPL-CCNC: 76 U/L — SIGNIFICANT CHANGE UP (ref 30–115)
ALT FLD-CCNC: 15 U/L — SIGNIFICANT CHANGE UP (ref 0–41)
ANION GAP SERPL CALC-SCNC: 11 MMOL/L — SIGNIFICANT CHANGE UP (ref 7–14)
APAP SERPL-MCNC: <5 UG/ML — LOW (ref 10–30)
APTT BLD: 30.5 SEC — SIGNIFICANT CHANGE UP (ref 27–39.2)
AST SERPL-CCNC: 28 U/L — SIGNIFICANT CHANGE UP (ref 0–41)
BASOPHILS # BLD AUTO: 0.04 K/UL — SIGNIFICANT CHANGE UP (ref 0–0.2)
BASOPHILS NFR BLD AUTO: 0.2 % — SIGNIFICANT CHANGE UP (ref 0–1)
BILIRUB SERPL-MCNC: 0.3 MG/DL — SIGNIFICANT CHANGE UP (ref 0.2–1.2)
BUN SERPL-MCNC: 22 MG/DL — HIGH (ref 10–20)
CALCIUM SERPL-MCNC: 8.6 MG/DL — SIGNIFICANT CHANGE UP (ref 8.4–10.5)
CHLORIDE SERPL-SCNC: 97 MMOL/L — LOW (ref 98–110)
CO2 SERPL-SCNC: 25 MMOL/L — SIGNIFICANT CHANGE UP (ref 17–32)
CREAT SERPL-MCNC: 1.2 MG/DL — SIGNIFICANT CHANGE UP (ref 0.7–1.5)
D DIMER BLD IA.RAPID-MCNC: <150 NG/ML DDU — SIGNIFICANT CHANGE UP (ref 0–230)
EGFR: 76 ML/MIN/1.73M2 — SIGNIFICANT CHANGE UP
EOSINOPHIL # BLD AUTO: 0.4 K/UL — SIGNIFICANT CHANGE UP (ref 0–0.7)
EOSINOPHIL NFR BLD AUTO: 2.4 % — SIGNIFICANT CHANGE UP (ref 0–8)
ETHANOL SERPL-MCNC: <10 MG/DL — SIGNIFICANT CHANGE UP
GLUCOSE SERPL-MCNC: 216 MG/DL — HIGH (ref 70–99)
HCT VFR BLD CALC: 36.3 % — LOW (ref 42–52)
HGB BLD-MCNC: 12.5 G/DL — LOW (ref 14–18)
HIV 1 & 2 AB SERPL IA.RAPID: SIGNIFICANT CHANGE UP
IMM GRANULOCYTES NFR BLD AUTO: 0.4 % — HIGH (ref 0.1–0.3)
INR BLD: 1.15 RATIO — SIGNIFICANT CHANGE UP (ref 0.65–1.3)
LYMPHOCYTES # BLD AUTO: 1.61 K/UL — SIGNIFICANT CHANGE UP (ref 1.2–3.4)
LYMPHOCYTES # BLD AUTO: 9.9 % — LOW (ref 20.5–51.1)
MCHC RBC-ENTMCNC: 30.2 PG — SIGNIFICANT CHANGE UP (ref 27–31)
MCHC RBC-ENTMCNC: 34.4 G/DL — SIGNIFICANT CHANGE UP (ref 32–37)
MCV RBC AUTO: 87.7 FL — SIGNIFICANT CHANGE UP (ref 80–94)
MONOCYTES # BLD AUTO: 1.13 K/UL — HIGH (ref 0.1–0.6)
MONOCYTES NFR BLD AUTO: 6.9 % — SIGNIFICANT CHANGE UP (ref 1.7–9.3)
NEUTROPHILS # BLD AUTO: 13.1 K/UL — HIGH (ref 1.4–6.5)
NEUTROPHILS NFR BLD AUTO: 80.2 % — HIGH (ref 42.2–75.2)
NRBC # BLD: 0 /100 WBCS — SIGNIFICANT CHANGE UP (ref 0–0)
PLATELET # BLD AUTO: 231 K/UL — SIGNIFICANT CHANGE UP (ref 130–400)
POTASSIUM SERPL-MCNC: 3.5 MMOL/L — SIGNIFICANT CHANGE UP (ref 3.5–5)
POTASSIUM SERPL-SCNC: 3.5 MMOL/L — SIGNIFICANT CHANGE UP (ref 3.5–5)
PROT SERPL-MCNC: 6.6 G/DL — SIGNIFICANT CHANGE UP (ref 6–8)
PROTHROM AB SERPL-ACNC: 13.2 SEC — HIGH (ref 9.95–12.87)
RBC # BLD: 4.14 M/UL — LOW (ref 4.7–6.1)
RBC # FLD: 13.4 % — SIGNIFICANT CHANGE UP (ref 11.5–14.5)
SALICYLATES SERPL-MCNC: 1 MG/DL — LOW (ref 4–30)
SARS-COV-2 RNA SPEC QL NAA+PROBE: SIGNIFICANT CHANGE UP
SODIUM SERPL-SCNC: 133 MMOL/L — LOW (ref 135–146)
TROPONIN T SERPL-MCNC: <0.01 NG/ML — SIGNIFICANT CHANGE UP
TROPONIN T SERPL-MCNC: <0.01 NG/ML — SIGNIFICANT CHANGE UP
WBC # BLD: 16.34 K/UL — HIGH (ref 4.8–10.8)
WBC # FLD AUTO: 16.34 K/UL — HIGH (ref 4.8–10.8)

## 2022-09-29 PROCEDURE — 71045 X-RAY EXAM CHEST 1 VIEW: CPT | Mod: 26

## 2022-09-29 PROCEDURE — 72125 CT NECK SPINE W/O DYE: CPT | Mod: 26,MA

## 2022-09-29 PROCEDURE — 93010 ELECTROCARDIOGRAM REPORT: CPT

## 2022-09-29 PROCEDURE — 93010 ELECTROCARDIOGRAM REPORT: CPT | Mod: 77

## 2022-09-29 PROCEDURE — 72170 X-RAY EXAM OF PELVIS: CPT | Mod: 26

## 2022-09-29 PROCEDURE — 70486 CT MAXILLOFACIAL W/O DYE: CPT | Mod: 26,MA

## 2022-09-29 PROCEDURE — 99222 1ST HOSP IP/OBS MODERATE 55: CPT

## 2022-09-29 PROCEDURE — 70450 CT HEAD/BRAIN W/O DYE: CPT | Mod: 26,MA

## 2022-09-29 RX ORDER — NALOXONE HYDROCHLORIDE 4 MG/.1ML
0.08 SPRAY NASAL ONCE
Refills: 0 | Status: DISCONTINUED | OUTPATIENT
Start: 2022-09-29 | End: 2022-09-29

## 2022-09-29 RX ORDER — BUPRENORPHINE AND NALOXONE 2; .5 MG/1; MG/1
0 TABLET SUBLINGUAL
Qty: 0 | Refills: 0 | DISCHARGE

## 2022-09-29 RX ORDER — POLYETHYLENE GLYCOL 3350 17 G/17G
17 POWDER, FOR SOLUTION ORAL DAILY
Refills: 0 | Status: DISCONTINUED | OUTPATIENT
Start: 2022-09-29 | End: 2022-10-01

## 2022-09-29 RX ORDER — ENOXAPARIN SODIUM 100 MG/ML
40 INJECTION SUBCUTANEOUS EVERY 24 HOURS
Refills: 0 | Status: DISCONTINUED | OUTPATIENT
Start: 2022-09-29 | End: 2022-10-01

## 2022-09-29 RX ORDER — NALOXONE HYDROCHLORIDE 4 MG/.1ML
0.08 SPRAY NASAL ONCE
Refills: 0 | Status: COMPLETED | OUTPATIENT
Start: 2022-09-29 | End: 2022-09-29

## 2022-09-29 RX ORDER — NALOXONE HYDROCHLORIDE 4 MG/.1ML
0.06 SPRAY NASAL
Qty: 2 | Refills: 0 | Status: DISCONTINUED | OUTPATIENT
Start: 2022-09-29 | End: 2022-09-30

## 2022-09-29 RX ORDER — NALOXONE HYDROCHLORIDE 4 MG/.1ML
0.04 SPRAY NASAL ONCE
Refills: 0 | Status: COMPLETED | OUTPATIENT
Start: 2022-09-29 | End: 2022-09-29

## 2022-09-29 RX ORDER — ONDANSETRON 8 MG/1
4 TABLET, FILM COATED ORAL ONCE
Refills: 0 | Status: COMPLETED | OUTPATIENT
Start: 2022-09-29 | End: 2022-09-29

## 2022-09-29 RX ORDER — SENNA PLUS 8.6 MG/1
2 TABLET ORAL AT BEDTIME
Refills: 0 | Status: DISCONTINUED | OUTPATIENT
Start: 2022-09-29 | End: 2022-10-01

## 2022-09-29 RX ORDER — SODIUM CHLORIDE 9 MG/ML
1000 INJECTION INTRAMUSCULAR; INTRAVENOUS; SUBCUTANEOUS
Refills: 0 | Status: DISCONTINUED | OUTPATIENT
Start: 2022-09-29 | End: 2022-10-01

## 2022-09-29 RX ORDER — NALOXONE HYDROCHLORIDE 4 MG/.1ML
0.08 SPRAY NASAL ONCE
Refills: 0 | Status: DISCONTINUED | OUTPATIENT
Start: 2022-09-29 | End: 2022-10-01

## 2022-09-29 RX ORDER — INFLUENZA VIRUS VACCINE 15; 15; 15; 15 UG/.5ML; UG/.5ML; UG/.5ML; UG/.5ML
0.5 SUSPENSION INTRAMUSCULAR ONCE
Refills: 0 | Status: DISCONTINUED | OUTPATIENT
Start: 2022-09-29 | End: 2022-10-01

## 2022-09-29 RX ORDER — TUBERCULIN PURIFIED PROTEIN DERIVATIVE 5 [IU]/.1ML
5 INJECTION, SOLUTION INTRADERMAL ONCE
Refills: 0 | Status: DISCONTINUED | OUTPATIENT
Start: 2022-09-29 | End: 2022-09-29

## 2022-09-29 RX ORDER — CHLORHEXIDINE GLUCONATE 213 G/1000ML
1 SOLUTION TOPICAL DAILY
Refills: 0 | Status: DISCONTINUED | OUTPATIENT
Start: 2022-09-29 | End: 2022-10-01

## 2022-09-29 RX ORDER — METHADONE HYDROCHLORIDE 40 MG/1
0 TABLET ORAL
Qty: 0 | Refills: 0 | DISCHARGE

## 2022-09-29 RX ORDER — PANTOPRAZOLE SODIUM 20 MG/1
40 TABLET, DELAYED RELEASE ORAL ONCE
Refills: 0 | Status: COMPLETED | OUTPATIENT
Start: 2022-09-29 | End: 2022-09-29

## 2022-09-29 RX ADMIN — CHLORHEXIDINE GLUCONATE 1 APPLICATION(S): 213 SOLUTION TOPICAL at 14:17

## 2022-09-29 RX ADMIN — NALOXONE HYDROCHLORIDE 0.04 MILLIGRAM(S): 4 SPRAY NASAL at 00:30

## 2022-09-29 RX ADMIN — ENOXAPARIN SODIUM 40 MILLIGRAM(S): 100 INJECTION SUBCUTANEOUS at 12:03

## 2022-09-29 RX ADMIN — POLYETHYLENE GLYCOL 3350 17 GRAM(S): 17 POWDER, FOR SOLUTION ORAL at 14:18

## 2022-09-29 RX ADMIN — NALOXONE HYDROCHLORIDE 0.08 MILLIGRAM(S): 4 SPRAY NASAL at 01:15

## 2022-09-29 RX ADMIN — NALOXONE HYDROCHLORIDE 0.04 MILLIGRAM(S): 4 SPRAY NASAL at 01:00

## 2022-09-29 RX ADMIN — PANTOPRAZOLE SODIUM 40 MILLIGRAM(S): 20 TABLET, DELAYED RELEASE ORAL at 12:04

## 2022-09-29 RX ADMIN — ONDANSETRON 4 MILLIGRAM(S): 8 TABLET, FILM COATED ORAL at 02:00

## 2022-09-29 RX ADMIN — NALOXONE HYDROCHLORIDE 0.08 MILLIGRAM(S): 4 SPRAY NASAL at 01:30

## 2022-09-29 NOTE — H&P ADULT - NSHPLABSRESULTS_GEN_ALL_CORE
12.5   16.34 )-----------( 231      ( 29 Sep 2022 00:45 )             36.3   09-29    133<L>  |  97<L>  |  22<H>  ----------------------------<  216<H>  3.5   |  25  |  1.2    Ca    8.6      29 Sep 2022 00:45    TPro  6.6  /  Alb  4.2  /  TBili  0.3  /  DBili  x   /  AST  28  /  ALT  15  /  AlkPhos  76  09-29

## 2022-09-29 NOTE — CONSULT NOTE ADULT - ASSESSMENT
IMPRESSION:    Opiate OD On Naloxone drip     PLAN:    CNS:  Wean Naloxone.  CATCH.  UDS SDS     HEENT: Oral care    PULMONARY:  HOB @ 45 degrees.  Aspiration precautions.      CARDIOVASCULAR:  NS 50 cc per hour.      GI: GI prophylaxis.  Feeding.  Bowel regimen     RENAL:  Follow up lytes.  Correct as needed    INFECTIOUS DISEASE: Follow up cultures.  Procal.  BC.  HIV and Hep panel     HEMATOLOGICAL:  DVT prophylaxis.  Dimer     ENDOCRINE:  Follow up FS.     MUSCULOSKELETAL:  OOB to chair     MICU     DG PM

## 2022-09-29 NOTE — CONSULT NOTE ADULT - SUBJECTIVE AND OBJECTIVE BOX
MEDICAL TOXICOLOGY CONSULT    HPI:  48 yo M hx of substance abuse, previously on methadone (last dose 2 months ago), pw suspected opoid OD. Patient was found on the floor by sister, minimally responsive with bruising on forehead and nose.     EC rate, NSR, 126PR, 112 QRS,  444 QTc  ONSET / TIME of exposure(s): unknown   QUANTITY of exposure(s): unknown   ROUTE of exposure: unknown  CONTEXT of exposure: found unresponsive by family at home.   ASSOCIATED symptoms: Bradypnea, somolence    PAST MEDICAL & SURGICAL HISTORY:  No pertinent past medical history  Substance abuse  No significant past surgical history          MEDICATION HISTORY:  naloxone Injectable 0.04 milliGRAM(s) IV Push Once  naloxone Injectable 0.08 milliGRAM(s) IV Push Once  naloxone Injectable 0.08 milliGRAM(s) IV Push Once  ondansetron Injectable 4 milliGRAM(s) IV Push once      FAMILY HISTORY:  No pertinent family history in first degree relatives         MEDICAL TOXICOLOGY CONSULT    HPI:  48 yo M hx of substance abuse, previously on methadone (last dose 2 months ago), pw suspected opoid OD. Patient was found on the floor by sister, minimally responsive with bruising on forehead and nose and pinpoint pupils. Sister attempted to administer naloxone but patient fought her off. No naloxone administered by EMS.     In the ED, patient received IV naloxone 0.04 with improvement of respiratory rate and pupil size. Shortly after, patient became bradypneic to 4 breaths per min and received another 0.08 and 0.08mg with improvement. When patient was awake, he endorsed endorsed snorting a bag of opioids and falling asleep. ED team started patient on Naloxone infusion at 0.06mg/hr and patient was admitted to ICU.     In the ICU, patient currently on 0.1mg/hr naloxone, is sleeping with RR of 9. Prior to sleeping, patient had respiratory rate of 13-14 on same naloxone infusion.     EC rate, NSR, 126PR, 112 QRS,  444 QTc  ONSET / TIME of exposure(s): unknown   QUANTITY of exposure(s): unknown   ROUTE of exposure: unknown  CONTEXT of exposure: found unresponsive by family at home.   ASSOCIATED symptoms: Bradypnea, somnolence, pinpoint pupils    PAST MEDICAL & SURGICAL HISTORY:  No pertinent past medical history  Substance abuse  No significant past surgical history    MEDICATION HISTORY:  naloxone Injectable 0.04 milliGRAM(s) IV Push Once  naloxone Injectable 0.08 milliGRAM(s) IV Push Once  naloxone Injectable 0.08 milliGRAM(s) IV Push Once  ondansetron Injectable 4 milliGRAM(s) IV Push once      FAMILY HISTORY:  No pertinent family history in first degree relatives    PHYSICAL EXAM  Vital Signs Last 24 Hrs  T(C): 36.6 (29 Sep 2022 03:00), Max: 36.7 (28 Sep 2022 23:57)  T(F): 97.9 (29 Sep 2022 03:00), Max: 98 (28 Sep 2022 23:57)  HR: 68 (29 Sep 2022 03:00) (68 - 89)  BP: 118/63 (29 Sep 2022 03:00) (118/63 - 122/61)  BP(mean): 87 (29 Sep 2022 03:00) (87 - 87)  RR: 9 (29 Sep 2022 03:00) (9 - 20)  SpO2: 91% (29 Sep 2022 03:00) (91% - 93%)    Parameters below as of 29 Sep 2022 03:00  Patient On (Oxygen Delivery Method): nasal cannula  O2 Flow (L/min): 3      SIGNIFICANT LABORATORY STUDIES:                        12.5   16.34 )-----------( 231      ( 29 Sep 2022 00:45 )             36.3           133<L>  |  97<L>  |  22<H>  ----------------------------<  216<H>  3.5   |  25  |  1.2    Ca    8.6      29 Sep 2022 00:45    TPro  6.6  /  Alb  4.2  /  TBili  0.3  /  DBili  x   /  AST  28  /  ALT  15  /  AlkPhos  76        PT/INR - ( 29 Sep 2022 00:45 )   PT: 13.20 sec;   INR: 1.15 ratio         PTT - ( 29 Sep 2022 00:45 )  PTT:30.5 sec        Anion Gap: 11  @ 00:45  CK: --  @ 00:45  Troponin:  --   @ 00:45  Pro-BNP:  --   @ 00:45  VBG:  --   @ 00:45  Carboxyhemoglobin %:  --   @ 00:45  Methemoglobin %:  --   @ 00:45  Osmolality Serum:  --   @ 00:45  Aspirin Level: 1.0<L>   @ 00:45  Acetaminophen Level:  <5.0<L>   @ 00:45  Ethanol Level:  --   @ 00:45  Digoxin Level:  --   @ 00:45  Phenytoin Level:  --   @ 00:45  Carbamazepine level:  --   @ 00:45  Lamotrigine level:  --   @ 00:45      RADIOLOGIC STUDIES  MPRESSION:    Head:  1. No CT evidence for acute intracranial pathology.  2. Mild right supraorbital soft tissue swelling.    Maxillofacial:  1. Indeterminant chip fractures of multiple upper and lower teeth,   favoring chronic given periapical lucencies and multifocal degenerative   carious disease. Recommend direct visualization.  2. Otherwise, no facial bone fractures.  3. Pansinus disease.    Cervical Spine:  1. No CT evidence for acute cervical spine fracture or subluxation.    ******PRELIMINARY REPORT******

## 2022-09-29 NOTE — ED PROVIDER NOTE - PHYSICAL EXAMINATION
SKIN: warm, dry  HEAD: Normocephalic atraumatic  EYES: no conjunctival erythema initially pinpoint pupils, pupils became 3mm b/l peerla after first dose narcan  ENT: no nasal discharge, airway clear, nasal bruising ttp  NECK: full ROM nontender  CARD: regular rate and rhythm  RESP: no wheezes, rales or rhonchi patient bradypneic when left alone 4-6 breaths per min, 10-14 when stimulated  ABD: soft, non-distended, non-tender  back: nontender spine  EXT: moving all extremities spontaneously  NEURO: alert and oriented x3, grossly unremarkable, arousable by sound, otherwise becomes somnolent and snores when left and bradypneic when left alone  PSYCH: cooperative

## 2022-09-29 NOTE — H&P ADULT - NSHPREVIEWOFSYSTEMS_GEN_ALL_CORE
Limited, as patient is very somnolent.   Denies headaches, denies facial pain   Denies SOB  Denies Chest Pain

## 2022-09-29 NOTE — H&P ADULT - NSHPPHYSICALEXAM_GEN_ALL_CORE
PHYSICAL EXAM:  Constitutional: pt is comfortable in bed; very somnolent   HEENT: Full ROM in bilateral eyes; pupils symmetrical and equal in size; no pupillary constriction noted bilaterally    Respiratory: (+) sounds in all lung fields; slight wheeze on R lower lobe; slow respiratory rate  Cardiovascular: S1 S2 regular rate and rhythm; no murmurs or gallops appreciated  Gastrointestinal: abdomen is non-distended, non-tender to superficial and deep palpation  Extremities: extremities are non-edematous  Vascular: Warm and Well perfused UE and LE; no mottling or dusky skin   Neurological: very somnolent; arouses easily to touch   Skin: no rashes or ulcerations noted; no scaling present

## 2022-09-29 NOTE — CHART NOTE - NSCHARTNOTEFT_GEN_A_CORE
Transfer Note    Transfer from:     Transfer to: (  ) Medicine    (  ) Telemetry     (  ) RCU       (  ) CEU    (  ) VENT                        (  ) Palliative    (  ) Stroke Unit    (  ) MICU    (  ) CCU    Signout given to:     ----------------------------------------------------------------------------------------------------------  HPI / ICU COURSE:    HPI:  47y M with no reported PMHx presenting for opioid overdose. Per sister bedside, patient injected IV heroin at around 10:30pm on 9/28/22. He had locked himself in the bathroom and passed out. When door was opened, patient was found unconscious, with blood from nose, and appeared to have hit his face. Patient has long history of opioid use, and attended inpatient rehab 6 months ago. He was clean for a while, then relapsed. To sister's knowledge, patient does not use any other drugs, but smokes tobacco.   Patient denying headache, chest pain, dyspnea.   Notably, pt's sister reports that he has had occasional "gasping" and SOB for several months. He reportedly tested + for TB in rehab 6 months prior, but never too his medication. `    ED Course:   /63, T 97.8, HR 68, 91% NC 3L, RR 9   EKG: ST-Depressions in some leads   X Ray Pelvis: no acute fracture   CT H: no intracranial pathology  CT Maxilla: no maxillofacial fracture; + soft tissue swelling   CT C-Spine: no fracture or sublux   Multiple doses of Narcan IV given in ED, then placed on drip.     Patient admitted for Acute Respiratory Failure from Opioid Overdose.    (29 Sep 2022 04:30)      --------------------------------------------------------------------------------------------------------  PMH/PSH:  PAST MEDICAL & SURGICAL HISTORY:  No pertinent past medical history    Substance abuse    No significant past surgical history        -------------------------------------------------------------------------------------------------------  PHYSICAL EXAM:  General: NAD  HEENT: Atraumatic  Respiratory: CTAB  Cardiac: RRR  Abdomen: soft, non-tender, non-distended  Extremities: warm and well-perfused  Neuro: A+Ox4. No FND    Vital Signs Last 24 Hrs  T(C): 36.1 (29 Sep 2022 20:00), Max: 37.1 (29 Sep 2022 12:00)  T(F): 96.9 (29 Sep 2022 20:00), Max: 98.8 (29 Sep 2022 12:00)  HR: 70 (29 Sep 2022 20:00) (66 - 89)  BP: 103/59 (29 Sep 2022 20:00) (90/41 - 135/75)  BP(mean): 73 (29 Sep 2022 20:00) (56 - 101)  RR: 12 (29 Sep 2022 20:00) (5 - 45)  SpO2: 95% (29 Sep 2022 20:00) (89% - 99%)    Parameters below as of 29 Sep 2022 20:00  Patient On (Oxygen Delivery Method): room air        I&O's Summary    28 Sep 2022 07:01  -  29 Sep 2022 07:00  --------------------------------------------------------  IN: 145 mL / OUT: 0 mL / NET: 145 mL    29 Sep 2022 07:01  -  29 Sep 2022 20:20  --------------------------------------------------------  IN: 250 mL / OUT: 500 mL / NET: -250 mL        --------------------------------------------------------------------------------------------------------  LABS:   CARDIAC MARKERS ( 29 Sep 2022 12:10 )  x     / <0.01 ng/mL / x     / x     / x      CARDIAC MARKERS ( 29 Sep 2022 01:40 )  x     / <0.01 ng/mL / x     / x     / x                                  12.5   16.34 )-----------( 231      ( 29 Sep 2022 00:45 )             36.3       09-29    133<L>  |  97<L>  |  22<H>  ----------------------------<  216<H>  3.5   |  25  |  1.2    Ca    8.6      29 Sep 2022 00:45    TPro  6.6  /  Alb  4.2  /  TBili  0.3  /  DBili  x   /  AST  28  /  ALT  15  /  AlkPhos  76  09-29      PT/INR - ( 29 Sep 2022 00:45 )   PT: 13.20 sec;   INR: 1.15 ratio         PTT - ( 29 Sep 2022 00:45 )  PTT:30.5 sec            Specimen Source:   Method Type:   Gram Stain:   Culture Results:   Bacteria:       -------------------------------------------------------------------------------------------------  RADIOLOGY:      ---------------------------------------------------------------------------------------------------  ASSESSMENT & PLAN:     # Acute Respiratory Failure from Opioid Overdose   - s/p naloxone drip  - off NC  - f.u tox reccs  - O2 93-95% on RA    # ST Depressions   - f.u EKG    # Hx of Latent TB   - negative CXR  - Follow up with Primary care as outpatient for RIPE therapy    FOR FOLLOW UP:  [ ] monitor O2  [ ] repeat CXR  [ ] social work f/u for rehab Transfer Note    Transfer from: MICU    Transfer to: ( x ) Medicine    (  ) Telemetry     (  ) RCU       (  ) CEU    (  ) VENT                        (  ) Palliative    (  ) Stroke Unit    (  ) MICU    (  ) CCU    Signout given to:     ----------------------------------------------------------------------------------------------------------  HPI / ICU COURSE:    HPI:  47y M with no reported PMHx presenting for opioid overdose. Per sister bedside, patient injected IV heroin at around 10:30pm on 9/28/22. He had locked himself in the bathroom and passed out. When door was opened, patient was found unconscious, with blood from nose, and appeared to have hit his face. Patient has long history of opioid use, and attended inpatient rehab 6 months ago. He was clean for a while, then relapsed. To sister's knowledge, patient does not use any other drugs, but smokes tobacco.   Patient denying headache, chest pain, dyspnea.   Notably, pt's sister reports that he has had occasional "gasping" and SOB for several months. He reportedly tested + for TB in rehab 6 months prior, but never too his medication. `    ED Course:   /63, T 97.8, HR 68, 91% NC 3L, RR 9   EKG: ST-Depressions in some leads   X Ray Pelvis: no acute fracture   CT H: no intracranial pathology  CT Maxilla: no maxillofacial fracture; + soft tissue swelling   CT C-Spine: no fracture or sublux   Multiple doses of Narcan IV given in ED, then placed on drip.     Patient admitted for Acute Respiratory Failure from Opioid Overdose.    (29 Sep 2022 04:30)      --------------------------------------------------------------------------------------------------------  PMH/PSH:  PAST MEDICAL & SURGICAL HISTORY:  No pertinent past medical history    Substance abuse    No significant past surgical history        -------------------------------------------------------------------------------------------------------  PHYSICAL EXAM:  General: NAD  HEENT: Atraumatic  Respiratory: CTAB  Cardiac: RRR  Abdomen: soft, non-tender, non-distended  Extremities: warm and well-perfused  Neuro: A+Ox4. No FND    Vital Signs Last 24 Hrs  T(C): 36.1 (29 Sep 2022 20:00), Max: 37.1 (29 Sep 2022 12:00)  T(F): 96.9 (29 Sep 2022 20:00), Max: 98.8 (29 Sep 2022 12:00)  HR: 70 (29 Sep 2022 20:00) (66 - 89)  BP: 103/59 (29 Sep 2022 20:00) (90/41 - 135/75)  BP(mean): 73 (29 Sep 2022 20:00) (56 - 101)  RR: 12 (29 Sep 2022 20:00) (5 - 45)  SpO2: 95% (29 Sep 2022 20:00) (89% - 99%)    Parameters below as of 29 Sep 2022 20:00  Patient On (Oxygen Delivery Method): room air        I&O's Summary    28 Sep 2022 07:01  -  29 Sep 2022 07:00  --------------------------------------------------------  IN: 145 mL / OUT: 0 mL / NET: 145 mL    29 Sep 2022 07:01  -  29 Sep 2022 20:20  --------------------------------------------------------  IN: 250 mL / OUT: 500 mL / NET: -250 mL        --------------------------------------------------------------------------------------------------------  LABS:   CARDIAC MARKERS ( 29 Sep 2022 12:10 )  x     / <0.01 ng/mL / x     / x     / x      CARDIAC MARKERS ( 29 Sep 2022 01:40 )  x     / <0.01 ng/mL / x     / x     / x                                  12.5   16.34 )-----------( 231      ( 29 Sep 2022 00:45 )             36.3       09-29    133<L>  |  97<L>  |  22<H>  ----------------------------<  216<H>  3.5   |  25  |  1.2    Ca    8.6      29 Sep 2022 00:45    TPro  6.6  /  Alb  4.2  /  TBili  0.3  /  DBili  x   /  AST  28  /  ALT  15  /  AlkPhos  76  09-29      PT/INR - ( 29 Sep 2022 00:45 )   PT: 13.20 sec;   INR: 1.15 ratio         PTT - ( 29 Sep 2022 00:45 )  PTT:30.5 sec            Specimen Source:   Method Type:   Gram Stain:   Culture Results:   Bacteria:       -------------------------------------------------------------------------------------------------  RADIOLOGY:      ---------------------------------------------------------------------------------------------------  ASSESSMENT & PLAN:     # Acute Respiratory Failure from Opioid Overdose   - s/p naloxone drip  - off NC  - f.u tox reccs  - O2 93-95% on RA    # ST Depressions   - f.u EKG    # Hx of Latent TB   - negative CXR  - Follow up with Primary care as outpatient for RIPE therapy    FOR FOLLOW UP:  [ ] monitor O2  [ ] repeat CXR  [ ] social work f/u for rehab

## 2022-09-29 NOTE — H&P ADULT - ASSESSMENT
47y M with no reported PMHx presenting for opioid overdose. Per sister bedside, patient injected IV heroin at around 10:30pm on 9/28/22. He had locked himself in the bathroom and passed out. When door was opened, patient was found unconscious, with blood from nose, and appeared to have hit his face. Patient has long history of opioid use, and attended inpatient rehab 6 months ago. He was clean for a while, then relapsed. To sister's knowledge, patient does not use any other drugs, but smokes tobacco.   Patient denying headache, chest pain, dyspnea.   Notably, pt's sister reports that he has had occasional "gasping" and SOB for several months. He reportedly tested + for TB in rehab 6 months prior, but never too his medication. `    ED Course:   /63, T 97.8, HR 68, 91% NC 3L, RR 9   EKG: ST-Depressions in some leads   X Ray Pelvis: no acute fracture   CT H: no intracranial pathology  CT Maxilla: no maxillofacial fracture; + soft tissue swelling   CT C-Spine: no fracture or sublux   Multiple doses of Narcan IV given in ED, then placed on drip.     IMPRESSION:    # Acute Respiratory Failure from Opioid Overdose   # ST Depressions   # Hx of Latent TB       PLAN:    CNS: Avoid all depressants; c/w Naloxone infusion at 0.14mg/hour    HEENT: HOB 45 degrees; reduce aspiration risk; oral care     PULMONARY: Titrate naloxone infusion to maintain RR 10, ETCO2 monitoring     CARDIOVASCULAR: f/u repeat troponins, serial EKGs     GI: GI prophylaxis.  Feeding as tolerated     RENAL:     INFECTIOUS DISEASE: f/u IGRA, PPD     HEMATOLOGICAL:  DVT prophylaxis.    ENDOCRINE:  Follow up FS.  Insulin protocol if needed.     47y M with no reported PMHx presenting for opioid overdose.  ED Course:   /63, T 97.8, HR 68, 91% NC 3L, RR 9   EKG: ST-Depressions in some leads   Multiple doses of Narcan IV given in ED, then placed on drip.     IMPRESSION:    # Acute Respiratory Failure from Opioid Overdose   # ST Depressions   # Hx of Latent TB       PLAN:    CNS: Avoid all depressants; c/w Naloxone infusion at 0.14mg/hour    HEENT: HOB 45 degrees; reduce aspiration risk; oral care     PULMONARY: Titrate naloxone infusion to maintain RR 10, ETCO2 monitoring     CARDIOVASCULAR: f/u repeat troponins, serial EKGs     GI: GI prophylaxis.  Feeding as tolerated     RENAL: replete lytes prn    INFECTIOUS DISEASE: f/u IGRA, PPD     HEMATOLOGICAL:  DVT prophylaxis.    ENDOCRINE:  Follow up FS.  Insulin protocol if needed.     47y M with no reported PMHx presenting for opioid overdose.  IMPRESSION:    # Acute Respiratory Failure from Opioid Overdose   # ST Depressions   # Hx of Latent TB       PLAN:    CNS: Avoid all depressants; c/w Naloxone infusion at 0.14mg/hour; f/u SBIRT consult     HEENT: HOB 45 degrees; reduce aspiration risk; oral care     PULMONARY: Titrate naloxone infusion to maintain RR 10, ETCO2 monitoring    CARDIOVASCULAR: f/u repeat troponins, serial EKGs; no chest pain on my interview     GI: GI prophylaxis.  Feeding as tolerated     RENAL: replete lytes prn    INFECTIOUS DISEASE: f/u IGRA, PPD     HEMATOLOGICAL:  DVT prophylaxis. (lovenox 40mg qd)    ENDOCRINE:  Follow up FS.  Insulin protocol if needed.

## 2022-09-29 NOTE — PATIENT PROFILE ADULT - DO YOU LACK THE NECESSARY SUPPORT TO HELP YOU COPE WITH LIFE CHALLENGES?
June 16, 2020        Wilfredoizzy Yoon  [address]    [facility] [id number]    This letter provides a written record that you were tested for COVID-19 on 6/15/20.   Your result was negative.    This means that we didn t find the virus that causes COVID-19 in your sample. A test may show negative when you do actually have the virus. This can happen when the virus is in the early stages of infection, before you feel illness symptoms.    Even if you don t have symptoms, they may still appear. For safety, it s very important to follow these rules.    Keep yourself away from others (self-isolation):      Stay home. Don t go to work, school or anywhere else.     Stay in your own room (and use your own bathroom), if you can.    Stay away from others in your home. No hugging, kissing or shaking hands. No visitors.    Clean  high touch  surfaces often (doorknobs, counters, handles, etc.). Use a household cleaning spray or wipes.    Cover your mouth and nose with a mask, tissue or washcloth to avoid spreading germs.    Wash your hands and face often with soap and water.    Stay in self-isolation until you meet ALL of the guidelines below:    1. You have had no fever for at least 72 hours (that is 3 full days of no fever without the use of medicine that reduces fevers), AND  2. other symptoms (such as cough, shortness of breath) have gotten better, AND  3. at least 10 days have passed since your symptoms first appeared.    Going back to work  Check with your employer for any guidelines to follow for going back to work.    Employers: This document serves as formal notice that your employee tested negative for COVID-19, as of the testing date shown above.    For questions regarding this letter or your Negative COVID-19 result, call [phone] between 8A to 6:30P (M-F) and 10A to 6:30P (weekends).   no

## 2022-09-29 NOTE — ED PROVIDER NOTE - CARE PLAN
Principal Discharge DX:	Opioid overdose   1 Principal Discharge DX:	Opioid overdose  Secondary Diagnosis:	ST segment depression  Secondary Diagnosis:	Nasal fracture

## 2022-09-29 NOTE — H&P ADULT - HISTORY OF PRESENT ILLNESS
47y M with no reported PMHx presenting for opioid overdose. Per sister bedside, patient injected IV heroin at around 10:30pm on 9/28/22. He had locked himself in the bathroom and passed out. When door was opened, patient was found unconscious, with blood from nose, and appeared to have hit his face. Patient has long history of opioid use, and attended inpatient rehab 6 months ago. He was clean for a while, then relapsed. To sister's knowledge, patient does not use any other drugs, but smokes tobacco.   Patient denying headache, chest pain, dyspnea.   Notably, pt's sister reports that he has had occasional "gasping" and SOB for several months. He reportedly tested + for TB in rehab 6 months prior, but never too his medication. `    ED Course:   /63, T 97.8, HR 68, 91% NC 3L, RR 9   EKG: ST-Depressions in some leads   X Ray Pelvis: no acute fracture   CT H: no intracranial pathology  CT Maxilla: no maxillofacial fracture; + soft tissue swelling   CT C-Spine: no fracture or sublux   Multiple doses of Narcan IV given in ED, then placed on drip.     Patient admitted for Acute Respiratory Failure from Opioid Overdose.

## 2022-09-29 NOTE — PATIENT PROFILE ADULT - FALL HARM RISK - HARM RISK INTERVENTIONS

## 2022-09-29 NOTE — CONSULT NOTE ADULT - ASSESSMENT
46 yo M presents with bradypnea, pinpoint pupils, semblance in the setting of opioid abuse.      Recommendations  - ETCo2 and naloxone infusion starting at 0.14mg/hour. Titrate as needed to maintain normal respiratory rate and ETCO2.   - Rest of care per ICU team.  - Will continue to follow.     - If patient develops respiratory depression while on the infusion can supplement with additional 0.1 mg naloxone and increase infusion by half of the initial rate.  - If there is concern for Naloxone induced catecholamine release induced pulmonary edema, would recommend to down-titrate the infusion and treat the patient supportively. 46 yo M presents with bradypnea, pinpoint pupils, semblance in the setting of opioid abuse.      Recommendations  - ETCo2 and naloxone infusion starting at 0.14mg/hour. Titrate as needed to maintain normal respiratory rate and ETCO2.   - Rest of care per ICU team.  - Will continue to follow.     - If patient develops respiratory depression while on the infusion can supplement with additional 0.1 mg naloxone and increase infusion by half of the initial rate.  - If there is concern for Naloxone induced catecholamine release induced pulmonary edema, would recommend to down-titrate the infusion and treat the patient supportively.    Medical Toxicology Attending Attestation Note - Shilo Morse MD  I have supervised and discussed the plan of care for this patient. I have made amendements to the documentation where necessary, and agree with history, and plan as documented by the fellow.

## 2022-09-29 NOTE — ED ADULT NURSE REASSESSMENT NOTE - NS ED NURSE REASSESS COMMENT FT1
Patient received in correia upon arrival, accusable to verbal and tactile stimulation. Patient placed on end-tidal capnography and noted to be bradypneic. Patient compliant with medication regimen, no complaints except being tired. Patient and family updated on plan of care and [id number] understanding.

## 2022-09-29 NOTE — ED PROVIDER NOTE - CLINICAL SUMMARY MEDICAL DECISION MAKING FREE TEXT BOX
opioid od, fall  imaging, labs, supportive care opioid od, fall  imaging, labs, supportive care  narcan ggt  admit to icu

## 2022-09-29 NOTE — ED PROVIDER NOTE - OBJECTIVE STATEMENT
47M w/ pmhx of opioid use disorder who was biba for opioid overdose. pt admitted to snorted a bag of opioids and falling forward. pt's sister came home and found pt on ground loc for 10 min. she tried to give him intranasal narcan but he fought her off, ems found him, did not need to give narcan at the time, no intervention by ems. pt denies cp sob back pain abd pain other drug use. denies si or hi. everyday smoker. occasional drinker.

## 2022-09-29 NOTE — ED PROVIDER NOTE - NS ED ROS FT
Constitutional: No fever   Eyes:  No visual changes  Ears:  No hearing changes  Neck: No neck pain  Cardiac:  No chest pain  Respiratory:  No SOB   GI:  No abdominal pain, nausea, or vomiting  :  No dysuria  MS:  No back pain  Neuro:  No headache or weakness.  No LOC  Skin:  No skin rash

## 2022-09-29 NOTE — CONSULT NOTE ADULT - SUBJECTIVE AND OBJECTIVE BOX
Patient is a 47y old  Male who presents with a chief complaint of overdose (29 Sep 2022 04:30)      HPI:  47y M with no reported PMHx presenting for opioid overdose. Per sister bedside, patient injected IV heroin at around 10:30pm on 9/28/22. He had locked himself in the bathroom and passed out. When door was opened, patient was found unconscious, with blood from nose, and appeared to have hit his face. Patient has long history of opioid use, and attended inpatient rehab 6 months ago. He was clean for a while, then relapsed. To sister's knowledge, patient does not use any other drugs, but smokes tobacco.   Patient denying headache, chest pain, dyspnea.   Notably, pt's sister reports that he has had occasional "gasping" and SOB for several months. He reportedly tested + for TB in rehab 6 months prior, but never too his medication. `    ED Course:   /63, T 97.8, HR 68, 91% NC 3L, RR 9   EKG: ST-Depressions in some leads   X Ray Pelvis: no acute fracture   CT H: no intracranial pathology  CT Maxilla: no maxillofacial fracture; + soft tissue swelling   CT C-Spine: no fracture or sublux   Multiple doses of Narcan IV given in ED, then placed on drip.     Patient admitted for Acute Respiratory Failure from Opioid Overdose.    (29 Sep 2022 04:30)      PAST MEDICAL & SURGICAL HISTORY:  No pertinent past medical history      Substance abuse      No significant past surgical history          SOCIAL HX:   Smoking       Active                   ETOH                            Other    IV Heroin     FAMILY HISTORY:  No pertinent family history in first degree relatives    :  No known cardiovacular family hisotry     Review Of Systems:     All ROS are negative except per HPI       Allergies    No Known Allergies    Intolerances          PHYSICAL EXAM    ICU Vital Signs Last 24 Hrs  T(C): 36.7 (29 Sep 2022 05:00), Max: 36.7 (28 Sep 2022 23:57)  T(F): 98 (29 Sep 2022 05:00), Max: 98 (28 Sep 2022 23:57)  HR: 68 (29 Sep 2022 06:00) (68 - 89)  BP: 106/62 (29 Sep 2022 06:00) (96/55 - 129/77)  BP(mean): 75 (29 Sep 2022 06:00) (71 - 101)  ABP: --  ABP(mean): --  RR: 5 (29 Sep 2022 06:00) (5 - 20)  SpO2: 98% (29 Sep 2022 06:00) (91% - 99%)    O2 Parameters below as of 29 Sep 2022 04:00  Patient On (Oxygen Delivery Method): nasal cannula    O2 Concentration (%): 3        CONSTITUTIONAL:  Well nourished.   NAD    ENT:   Airway patent,   Mouth with normal mucosa.       CARDIAC:   Normal rate,   Regular rhythm.    No edema      RESPIRATORY:   No wheezing  Bilateral BS   Not tachypneic,  No use of accessory muscles    GASTROINTESTINAL:  Abdomen soft,   Non-tender,   No guarding,   + BS      NEUROLOGICAL:   Alert and oriented   No motor deficits.    SKIN:   Skin normal color for race,   No evidence of rash.              09-28-22 @ 07:01  -  09-29-22 @ 07:00  --------------------------------------------------------  IN:    Naloxone: 145 mL  Total IN: 145 mL    OUT:  Total OUT: 0 mL    Total NET: 145 mL          LABS:                          12.5   16.34 )-----------( 231      ( 29 Sep 2022 00:45 )             36.3                                               09-29    133<L>  |  97<L>  |  22<H>  ----------------------------<  216<H>  3.5   |  25  |  1.2    Ca    8.6      29 Sep 2022 00:45    TPro  6.6  /  Alb  4.2  /  TBili  0.3  /  DBili  x   /  AST  28  /  ALT  15  /  AlkPhos  76  09-29      PT/INR - ( 29 Sep 2022 00:45 )   PT: 13.20 sec;   INR: 1.15 ratio         PTT - ( 29 Sep 2022 00:45 )  PTT:30.5 sec                                           CARDIAC MARKERS ( 29 Sep 2022 01:40 )  x     / <0.01 ng/mL / x     / x     / x                                                LIVER FUNCTIONS - ( 29 Sep 2022 00:45 )  Alb: 4.2 g/dL / Pro: 6.6 g/dL / ALK PHOS: 76 U/L / ALT: 15 U/L / AST: 28 U/L / GGT: x                                                                                                                                       X-Rays reviewed                                                                                     ECHO        MEDICATIONS  (STANDING):  chlorhexidine 2% Cloths 1 Application(s) Topical daily  enoxaparin Injectable 40 milliGRAM(s) SubCutaneous every 24 hours  influenza   Vaccine 0.5 milliLiter(s) IntraMuscular once  naloxone Infusion 0.06 mG/Hr (15 mL/Hr) IV Continuous <Continuous>  naloxone Injectable 0.08 milliGRAM(s) IV Push once  pantoprazole   Suspension 40 milliGRAM(s) Oral once  PPD  5 Tuberculin Unit(s) Injectable 5 Unit(s) IntraDermal once    MEDICATIONS  (PRN):

## 2022-09-29 NOTE — ED PROVIDER NOTE - PROGRESS NOTE DETAILS
RK: initially patient bradypneic 8 bpm, 0.04mg narcan given, 15 min later, pt 8 bpm again snoring, giving another 0.04mg narcan. will continue to monitor closely. RK: initially patient bradypneic 8 bpm, 0.04mg narcan given, 15 min later, pt 8 bpm again snoring, giving another 0.04mg narcan. will continue to monitor closely. toxicology consulted. pt agreeable to speak with toxiology. RK: Pt s/p 0.04mg x2, still 4bpm snoring, gave another 0.08mg narcan, tox fellow aware. RK: Pt s/p 0.04mg x2, still 4bpm snoring, gave another 0.08mg narcan, tox fellow aware. new ST depressions inferior leads when compared to EKG from 2 weeks ago. pt denies CP but he's groggy and just tells me to go away. he's agreeable to admission and understands the danger of opioid overdose. adding troponin to lab. will follow. pt breathing 4bpm again, another 0.08mg narcan given. endorsed pt to critical care fellow Dr. White, pt accepted for ICU admission and narcan infusion to be started. RK: Pt s/p 0.04mg x2, still 4bpm snoring, gave another 0.08mg narcan, tox fellow aware. new ST depressions inferior leads when compared to EKG from 2 weeks ago. pt denies CP but he's groggy and just tells me to go away. he's agreeable to admission and understands the danger of opioid overdose. adding troponin to lab. will follow. pt breathing 4bpm again, another 0.08mg narcan given. endorsed pt to critical care fellow Dr. White, pt accepted for ICU admission and narcan infusion to be started. CT maxillofacial shows possible nasal fx b/l, pending final read. no head bleed appreciate, ICU team to follow troponin and CT reads per Dr. White. RK: troponin negative still pending final CT reads. RK: CT negative

## 2022-09-29 NOTE — ED PROVIDER NOTE - ATTENDING CONTRIBUTION TO CARE
opioid OD, no narcan given, found on floor by sister.   pt arrived somnolent, bruising to forehead/nose.   spine nt, ctab, rrr, ab soft, nt. nfd.   arousable with verbal stim.  RR ~8.    will give narcan, keep on EtCO2, imaging, labs, supportive care  reassess

## 2022-09-30 DIAGNOSIS — T40.1X1A POISONING BY HEROIN, ACCIDENTAL (UNINTENTIONAL), INITIAL ENCOUNTER: ICD-10-CM

## 2022-09-30 LAB
ALBUMIN SERPL ELPH-MCNC: 3 G/DL — LOW (ref 3.5–5.2)
ALP SERPL-CCNC: 66 U/L — SIGNIFICANT CHANGE UP (ref 30–115)
ALT FLD-CCNC: 11 U/L — SIGNIFICANT CHANGE UP (ref 0–41)
ANION GAP SERPL CALC-SCNC: 12 MMOL/L — SIGNIFICANT CHANGE UP (ref 7–14)
AST SERPL-CCNC: 16 U/L — SIGNIFICANT CHANGE UP (ref 0–41)
BASOPHILS # BLD AUTO: 0.04 K/UL — SIGNIFICANT CHANGE UP (ref 0–0.2)
BASOPHILS NFR BLD AUTO: 0.6 % — SIGNIFICANT CHANGE UP (ref 0–1)
BILIRUB SERPL-MCNC: 0.3 MG/DL — SIGNIFICANT CHANGE UP (ref 0.2–1.2)
BUN SERPL-MCNC: 13 MG/DL — SIGNIFICANT CHANGE UP (ref 10–20)
CALCIUM SERPL-MCNC: 7 MG/DL — LOW (ref 8.4–10.4)
CHLORIDE SERPL-SCNC: 103 MMOL/L — SIGNIFICANT CHANGE UP (ref 98–110)
CO2 SERPL-SCNC: 26 MMOL/L — SIGNIFICANT CHANGE UP (ref 17–32)
CREAT SERPL-MCNC: 0.9 MG/DL — SIGNIFICANT CHANGE UP (ref 0.7–1.5)
EGFR: 107 ML/MIN/1.73M2 — SIGNIFICANT CHANGE UP
EOSINOPHIL # BLD AUTO: 0.51 K/UL — SIGNIFICANT CHANGE UP (ref 0–0.7)
EOSINOPHIL NFR BLD AUTO: 8 % — SIGNIFICANT CHANGE UP (ref 0–8)
GLUCOSE SERPL-MCNC: 94 MG/DL — SIGNIFICANT CHANGE UP (ref 70–99)
HAV IGM SER-ACNC: SIGNIFICANT CHANGE UP
HBV CORE IGM SER-ACNC: SIGNIFICANT CHANGE UP
HBV SURFACE AG SER-ACNC: SIGNIFICANT CHANGE UP
HCT VFR BLD CALC: 36.5 % — LOW (ref 42–52)
HCV AB S/CO SERPL IA: 11.53 S/CO — HIGH (ref 0–0.99)
HCV AB SERPL-IMP: REACTIVE
HCV RNA FLD QL NAA+PROBE: SIGNIFICANT CHANGE UP
HCV RNA SPEC QL PROBE+SIG AMP: SIGNIFICANT CHANGE UP
HGB BLD-MCNC: 12.6 G/DL — LOW (ref 14–18)
IMM GRANULOCYTES NFR BLD AUTO: 0.2 % — SIGNIFICANT CHANGE UP (ref 0.1–0.3)
LYMPHOCYTES # BLD AUTO: 2.46 K/UL — SIGNIFICANT CHANGE UP (ref 1.2–3.4)
LYMPHOCYTES # BLD AUTO: 38.8 % — SIGNIFICANT CHANGE UP (ref 20.5–51.1)
MAGNESIUM SERPL-MCNC: 1.7 MG/DL — LOW (ref 1.8–2.4)
MCHC RBC-ENTMCNC: 30.1 PG — SIGNIFICANT CHANGE UP (ref 27–31)
MCHC RBC-ENTMCNC: 34.5 G/DL — SIGNIFICANT CHANGE UP (ref 32–37)
MCV RBC AUTO: 87.1 FL — SIGNIFICANT CHANGE UP (ref 80–94)
MONOCYTES # BLD AUTO: 0.52 K/UL — SIGNIFICANT CHANGE UP (ref 0.1–0.6)
MONOCYTES NFR BLD AUTO: 8.2 % — SIGNIFICANT CHANGE UP (ref 1.7–9.3)
NEUTROPHILS # BLD AUTO: 2.8 K/UL — SIGNIFICANT CHANGE UP (ref 1.4–6.5)
NEUTROPHILS NFR BLD AUTO: 44.2 % — SIGNIFICANT CHANGE UP (ref 42.2–75.2)
NRBC # BLD: 0 /100 WBCS — SIGNIFICANT CHANGE UP (ref 0–0)
PLATELET # BLD AUTO: 169 K/UL — SIGNIFICANT CHANGE UP (ref 130–400)
POTASSIUM SERPL-MCNC: 3.9 MMOL/L — SIGNIFICANT CHANGE UP (ref 3.5–5)
POTASSIUM SERPL-SCNC: 3.9 MMOL/L — SIGNIFICANT CHANGE UP (ref 3.5–5)
PROCALCITONIN SERPL-MCNC: 0.27 NG/ML — HIGH (ref 0.02–0.1)
PROT SERPL-MCNC: 5.5 G/DL — LOW (ref 6–8)
RBC # BLD: 4.19 M/UL — LOW (ref 4.7–6.1)
RBC # FLD: 13.9 % — SIGNIFICANT CHANGE UP (ref 11.5–14.5)
SODIUM SERPL-SCNC: 141 MMOL/L — SIGNIFICANT CHANGE UP (ref 135–146)
WBC # BLD: 6.34 K/UL — SIGNIFICANT CHANGE UP (ref 4.8–10.8)
WBC # FLD AUTO: 6.34 K/UL — SIGNIFICANT CHANGE UP (ref 4.8–10.8)

## 2022-09-30 PROCEDURE — ZZZZZ: CPT

## 2022-09-30 PROCEDURE — 99232 SBSQ HOSP IP/OBS MODERATE 35: CPT

## 2022-09-30 RX ORDER — MAGNESIUM OXIDE 400 MG ORAL TABLET 241.3 MG
400 TABLET ORAL ONCE
Refills: 0 | Status: COMPLETED | OUTPATIENT
Start: 2022-09-30 | End: 2022-09-30

## 2022-09-30 RX ORDER — MAGNESIUM SULFATE 500 MG/ML
2 VIAL (ML) INJECTION
Refills: 0 | Status: COMPLETED | OUTPATIENT
Start: 2022-09-30 | End: 2022-09-30

## 2022-09-30 RX ORDER — LANOLIN ALCOHOL/MO/W.PET/CERES
5 CREAM (GRAM) TOPICAL AT BEDTIME
Refills: 0 | Status: DISCONTINUED | OUTPATIENT
Start: 2022-09-30 | End: 2022-10-01

## 2022-09-30 RX ADMIN — SENNA PLUS 2 TABLET(S): 8.6 TABLET ORAL at 21:34

## 2022-09-30 RX ADMIN — SODIUM CHLORIDE 50 MILLILITER(S): 9 INJECTION INTRAMUSCULAR; INTRAVENOUS; SUBCUTANEOUS at 21:35

## 2022-09-30 RX ADMIN — SODIUM CHLORIDE 50 MILLILITER(S): 9 INJECTION INTRAMUSCULAR; INTRAVENOUS; SUBCUTANEOUS at 06:10

## 2022-09-30 RX ADMIN — ENOXAPARIN SODIUM 40 MILLIGRAM(S): 100 INJECTION SUBCUTANEOUS at 11:49

## 2022-09-30 RX ADMIN — MAGNESIUM OXIDE 400 MG ORAL TABLET 400 MILLIGRAM(S): 241.3 TABLET ORAL at 21:35

## 2022-09-30 RX ADMIN — Medication 25 GRAM(S): at 16:12

## 2022-09-30 NOTE — PROVIDER CONTACT NOTE (OTHER) - SITUATION
Pt pulled out his iv and refused his two mag riders. dr keyona barnhart has no iv that pt refuses a new iv

## 2022-09-30 NOTE — PROGRESS NOTE ADULT - ASSESSMENT
Mr. Pierce is a 50 YO man who came with opioid overdose. Patient has a history of opioid abuse and was attending inpatient rehab 6 months ago, was clean, and then relapsed. Patient also reportedly tested + for Tb in rehab but never received treatment according to sister.      A/P  # Acute respiratory failure 2/2 to opioid overdose/ h/o drug abuse   - treated with  naloxone drip, admitted to ICU, clinically improved and was downgraded to medical floor   - will consult CATCH/ addiction medicine team   - pt has no sings of withdrawal, asking for Suboxone  - replete Mg       #Hx of latent TB  - asymptomatic, normal CXR   - OP follow up with PMD       # Misc  - DVT Prophylaxis: Lovenox  - Diet: Diet, Regular  - Code Status: Full     # DisPo: anticipate discharge in 24 hours   - Pending: Addiction medicine FU

## 2022-10-01 ENCOUNTER — TRANSCRIPTION ENCOUNTER (OUTPATIENT)
Age: 47
End: 2022-10-01

## 2022-10-01 VITALS — SYSTOLIC BLOOD PRESSURE: 135 MMHG | HEART RATE: 71 BPM | DIASTOLIC BLOOD PRESSURE: 73 MMHG | TEMPERATURE: 97 F

## 2022-10-01 LAB
GAMMA INTERFERON BACKGROUND BLD IA-ACNC: 0.01 IU/ML — SIGNIFICANT CHANGE UP
M TB IFN-G BLD-IMP: ABNORMAL
M TB IFN-G CD4+ BCKGRND COR BLD-ACNC: 0.02 IU/ML — SIGNIFICANT CHANGE UP
M TB IFN-G CD4+CD8+ BCKGRND COR BLD-ACNC: 0.02 IU/ML — SIGNIFICANT CHANGE UP
QUANT TB PLUS MITOGEN MINUS NIL: 0.37 IU/ML — SIGNIFICANT CHANGE UP

## 2022-10-01 PROCEDURE — 99232 SBSQ HOSP IP/OBS MODERATE 35: CPT

## 2022-10-01 NOTE — DISCHARGE NOTE PROVIDER - HOSPITAL COURSE
47y M with no reported PMHx presenting for opioid overdose. Per sister bedside, patient injected IV heroin at around 10:30pm on 9/28/22. He had locked himself in the bathroom and passed out. When door was opened, patient was found unconscious, with blood from nose, and appeared to have hit his face. Patient has long history of opioid use, and attended inpatient rehab 6 months ago. He was clean for a while, then relapsed. To sister's knowledge, patient does not use any other drugs, but smokes tobacco.   Patient denying headache, chest pain, dyspnea.   Notably, pt's sister reports that he has had occasional "gasping" and SOB for several months. He reportedly tested + for TB in rehab 6 months prior, but never too his medication. `    ED Course:   /63, T 97.8, HR 68, 91% NC 3L, RR 9   EKG: ST-Depressions in some leads   X Ray Pelvis: no acute fracture   CT H: no intracranial pathology  CT Maxilla: no maxillofacial fracture; + soft tissue swelling   CT C-Spine: no fracture or sublux   Multiple doses of Narcan IV given in ED, then placed on drip.     Patient admitted for Acute Respiratory Failure from Opioid Overdose.     hospital course:  Mr. Pierce is a 48 YO man who came with opioid overdose. Patient has a history of opioid abuse and was attending inpatient rehab 6 months ago, was clean, and then relapsed. Patient also reportedly tested + for Tb in rehab but never received treatment according to sister.    #Acute respiratory failure 2/2 to opioid overdose  - Patient was admitted on a naloxone drip, weaned off.   - Currently off the drip, RR is 16, on RA, satting well. not in distress.   - FU addiction medicine for management of opioid abuse     #ST depression on EKG   - FU EKG     #Hx of latent TB  - Patient did not receive treatment?   - FU with primary care OP for possile treatment   - CXR is negative, patient is asymptomatic     #HCV infection  - Reactive HCV testing  - Monitor LFTs  - FU OP with GI

## 2022-10-01 NOTE — PROGRESS NOTE ADULT - ASSESSMENT
Mr. Pierce is a 50 YO man who came with opioid overdose. Patient has a history of opioid abuse and was attending inpatient rehab 6 months ago, was clean, and then relapsed. Patient also reportedly tested + for Tb in rehab but never received treatment according to sister.      A/P  # Acute respiratory failure 2/2 to opioid overdose/ h/o drug abuse   - treated with  naloxone drip, admitted to ICU, clinically improved and was downgraded to medical floor   -  consulted by  CATCH/ addiction medicine team, was cleared for discharge   - pt has no sings of withdrawal  - Pt was extensively consulted regarding danger involved in using recreational drugs, including risk of death, he verbalized his understanding, has no intentions  to use drugs any longer.     #Hx of latent TB  - asymptomatic, normal CXR   - OP follow up with PMD       # Misc  - DVT Prophylaxis: Lovenox  - Diet: Diet, Regular  - Code Status: Full     # Dispo: pt is stable for discharge home today, he will follow up with PMD in [place]

## 2022-10-01 NOTE — CHART NOTE - NSCHARTNOTEFT_GEN_A_CORE
Addiction Medicine was consulted to see patient for possible medical management of opiate withdrawal symptoms. When interviewers introduced themselves, patient reported that he was ready to be discharged and that "I don't need therapy." Patient refused to speak with interviewers and walked out of his room. Patient then spoke to his Primary team to let him leave the hospital as soon as possible. Patient was discharged shortly after. Addiction Medicine was consulted to see patient for possible medical management of opiate withdrawal symptoms. When interviewers introduced themselves, the patient reported that he was ready to be discharged and that "I don't need therapy." Patient refused to speak with interviewers and walked out of his room. Patient then spoke to his Primary team to let him leave the hospital as soon as possible. Patient was discharged shortly after.

## 2022-10-01 NOTE — DISCHARGE NOTE PROVIDER - NSDCCPCAREPLAN_GEN_ALL_CORE_FT
PRINCIPAL DISCHARGE DIAGNOSIS  Diagnosis: Opioid overdose  Assessment and Plan of Treatment: You had an opioid overdose, and that was treated with opioid antagonist. Please make sure you avoid drugs as it can lead to immediate death. PLease follow up with your doctor and seek help whenever needed.

## 2022-10-01 NOTE — PROGRESS NOTE ADULT - SUBJECTIVE AND OBJECTIVE BOX
47y M with no reported PMHx presenting for opioid overdose. Per sister bedside, patient injected IV heroin at around 10:30pm on 9/28/22. He had locked himself in the bathroom and passed out. When door was opened, patient was found unconscious, with blood from nose, and appeared to have hit his face. Patient has long history of opioid use, and attended inpatient rehab 6 months ago. He was clean for a while, then relapsed. To sister's knowledge, patient does not use any other drugs, but smokes tobacco.   Patient denying headache, chest pain, dyspnea.   Notably, pt's sister reports that he has had occasional "gasping" and SOB for several months. He reportedly tested + for TB in rehab 6 months prior, but never too his medication. `  EKG: ST-Depressions in some leads   X Ray Pelvis: no acute fracture   CT H: no intracranial pathology  CT Maxilla: no maxillofacial fracture; + soft tissue swelling   CT C-Spine: no fracture or sublux   Multiple doses of Narcan IV given in ED, then placed on drip.   Patient admitted for Acute Respiratory Failure from Opioid Overdose to ICU, downgraded to medical floor.   He was consulted by CATCH team and cleared for discharge, today pt is comfortable, denies any complaints, asking about discharge.         PAST MEDICAL & SURGICAL HISTORY:  Substance abuse  No significant past surgical history    Tobacco use:   EtOH use:  Illicit drug use:    Living situation:    Allergies:  No Known Allergies      FAMILY HISTORY:  No pertinent family history in first degree relatives    Vital Signs Last 24 Hrs  T(C): 36.2 (01 Oct 2022 06:00), Max: 36.2 (01 Oct 2022 06:00)  T(F): 97.2 (01 Oct 2022 06:00), Max: 97.2 (01 Oct 2022 06:00)  HR: 71 (01 Oct 2022 06:00) (68 - 72)  BP: 135/73 (01 Oct 2022 06:00) (131/80 - 135/73)  BP(mean): --  RR: 18 (30 Sep 2022 20:00) (18 - 18)      PHYSICAL EXAM:  GENERAL: NAD, lying in bed comfortably  HEAD:  Atraumatic, Normocephalic  EYES: EOMI, PERRLA, conjunctiva and sclera clear  ENT: Moist mucous membranes  NECK: Supple, No JVD  CHEST/LUNG: decreased BS at bases   HEART: Regular rate and rhythm; No murmurs, rubs, or gallops  ABDOMEN: Bowel sounds present; Soft, Nontender, Nondistended  EXTREMITIES:  2+ Peripheral Pulses, brisk capillary refill. No clubbing, cyanosis, or edema  NERVOUS SYSTEM:  Alert & Oriented X3, speech clear. No deficits   MSK: FROM all 4 extremities, full and equal strength  SKIN: No rashes or lesions    LABS:                                   12.6   6.34  )-----------( 169      ( 30 Sep 2022 06:51 )             36.5   09-30    141  |  103  |  13  ----------------------------<  94  3.9   |  26  |  0.9    Ca    7.0<L>      30 Sep 2022 06:51  Mg     1.7     09-30    TPro  5.5<L>  /  Alb  3.0<L>  /  TBili  0.3  /  DBili  x   /  AST  16  /  ALT  11  /  AlkPhos  66  09-30        CARDIAC MARKERS ( 29 Sep 2022 12:10 )  x     / <0.01 ng/mL / x     / x     / x      CARDIAC MARKERS ( 29 Sep 2022 01:40 )  x     / <0.01 ng/mL / x     / x     / x        RADIOLOGY:    < from: CT Cervical Spine No Cont (09.29.22 @ 01:17) >    IMPRESSION:    Head:  1. No CT evidence for acute intracranial pathology.    Maxillofacial:  1. No evidence for acute maxillofacial bone fracture.  2. Multiple dental caries. Limited ability to exclude small chip   fractures of the teeth. Correlation with dental exam suggested.  3. Right supra and periorbital soft tissues along.  4. Pansinus mucosal thickening.    Cervical Spine:  1. No CT evidence for acute cervical spine fracture or subluxation.    < end of copied text >  < from: Xray Pelvis AP only (09.29.22 @ 00:38) >  IMPRESSION:    No acute fracture or dislocation.  Mild bilateral hip degenerative change.  Pelvic phleboliths.      MEDICATIONS  (STANDING):  chlorhexidine 2% Cloths 1 Application(s) Topical daily  enoxaparin Injectable 40 milliGRAM(s) SubCutaneous every 24 hours  influenza   Vaccine 0.5 milliLiter(s) IntraMuscular once  melatonin 5 milliGRAM(s) Oral at bedtime  naloxone Injectable 0.08 milliGRAM(s) IV Push once  polyethylene glycol 3350 17 Gram(s) Oral daily  senna 2 Tablet(s) Oral at bedtime  sodium chloride 0.9%. 1000 milliLiter(s) (50 mL/Hr) IV Continuous <Continuous>                
Patient is a 47y old  Male who presents with a chief complaint of overdose (29 Sep 2022 08:07)      HPI:  47y M with no reported PMHx presenting for opioid overdose. Per sister bedside, patient injected IV heroin at around 10:30pm on 9/28/22. He had locked himself in the bathroom and passed out. When door was opened, patient was found unconscious, with blood from nose, and appeared to have hit his face. Patient has long history of opioid use, and attended inpatient rehab 6 months ago. He was clean for a while, then relapsed. To sister's knowledge, patient does not use any other drugs, but smokes tobacco.   Patient denying headache, chest pain, dyspnea.   Notably, pt's sister reports that he has had occasional "gasping" and SOB for several months. He reportedly tested + for TB in rehab 6 months prior, but never too his medication. `    ED Course:   /63, T 97.8, HR 68, 91% NC 3L, RR 9   EKG: ST-Depressions in some leads   X Ray Pelvis: no acute fracture   CT H: no intracranial pathology  CT Maxilla: no maxillofacial fracture; + soft tissue swelling   CT C-Spine: no fracture or sublux   Multiple doses of Narcan IV given in ED, then placed on drip.     Patient admitted for Acute Respiratory Failure from Opioid Overdose.    (29 Sep 2022 04:30)      PAST MEDICAL & SURGICAL HISTORY:  No pertinent past medical history      Substance abuse      No significant past surgical history          Home Medications:      .  Tobacco use:   EtOH use:  Illicit drug use:    Living situation:    Allergies:  No Known Allergies      FAMILY HISTORY:  No pertinent family history in first degree relatives        Hospital Medications:  chlorhexidine 2% Cloths 1 Application(s) Topical daily  enoxaparin Injectable 40 milliGRAM(s) SubCutaneous every 24 hours  influenza   Vaccine 0.5 milliLiter(s) IntraMuscular once  naloxone Infusion 0.06 mG/Hr IV Continuous <Continuous>  naloxone Injectable 0.08 milliGRAM(s) IV Push once  polyethylene glycol 3350 17 Gram(s) Oral daily  senna 2 Tablet(s) Oral at bedtime  sodium chloride 0.9%. 1000 milliLiter(s) IV Continuous <Continuous>      Vital Signs Last 24 Hrs  T(C): 36.4 (30 Sep 2022 04:58), Max: 37.1 (29 Sep 2022 12:00)  T(F): 97.5 (30 Sep 2022 04:58), Max: 98.8 (29 Sep 2022 12:00)  HR: 61 (30 Sep 2022 04:58) (56 - 76)  BP: 124/71 (30 Sep 2022 04:58) (90/41 - 132/57)  BP(mean): 76 (29 Sep 2022 23:00) (56 - 92)  RR: 18 (30 Sep 2022 04:58) (8 - 21)  SpO2: 97% (30 Sep 2022 00:08) (92% - 97%)    Parameters below as of 30 Sep 2022 00:08  Patient On (Oxygen Delivery Method): room air        I&O's Summary    29 Sep 2022 07:01  -  30 Sep 2022 07:00  --------------------------------------------------------  IN: 450 mL / OUT: 1100 mL / NET: -650 mL        LABS:                        12.6   6.34  )-----------( 169      ( 30 Sep 2022 06:51 )             36.5       09-30    141  |  103  |  13  ----------------------------<  94  3.9   |  26  |  0.9    Ca    7.0<L>      30 Sep 2022 06:51  Mg     1.7     09-30    TPro  5.5<L>  /  Alb  3.0<L>  /  TBili  0.3  /  DBili  x   /  AST  16  /  ALT  11  /  AlkPhos  66  09-30      CARDIAC MARKERS ( 29 Sep 2022 12:10 )  x     / <0.01 ng/mL / x     / x     / x      CARDIAC MARKERS ( 29 Sep 2022 01:40 )  x     / <0.01 ng/mL / x     / x     / x                PHYSICAL EXAM:  GENERAL: NAD, lying in bed comfortably  HEAD:  Atraumatic, Normocephalic  EYES: EOMI, PERRLA, conjunctiva and sclera clear  ENT: Moist mucous membranes  NECK: Supple, No JVD  CHEST/LUNG: Clear to auscultation bilaterally; No rales, rhonchi, wheezing, or rubs. Unlabored respirations  HEART: Regular rate and rhythm; No murmurs, rubs, or gallops  ABDOMEN: Bowel sounds present; Soft, Nontender, Nondistended. No hepatomegally  EXTREMITIES:  2+ Peripheral Pulses, brisk capillary refill. No clubbing, cyanosis, or edema  NERVOUS SYSTEM:  Alert & Oriented X3, speech clear. No deficits   MSK: FROM all 4 extremities, full and equal strength  SKIN: No rashes or lesions    IMAGES:            
47y M with no reported PMHx presenting for opioid overdose. Per sister bedside, patient injected IV heroin at around 10:30pm on 9/28/22. He had locked himself in the bathroom and passed out. When door was opened, patient was found unconscious, with blood from nose, and appeared to have hit his face. Patient has long history of opioid use, and attended inpatient rehab 6 months ago. He was clean for a while, then relapsed. To sister's knowledge, patient does not use any other drugs, but smokes tobacco.   Patient denying headache, chest pain, dyspnea.   Notably, pt's sister reports that he has had occasional "gasping" and SOB for several months. He reportedly tested + for TB in rehab 6 months prior, but never too his medication. `  EKG: ST-Depressions in some leads   X Ray Pelvis: no acute fracture   CT H: no intracranial pathology  CT Maxilla: no maxillofacial fracture; + soft tissue swelling   CT C-Spine: no fracture or sublux   Multiple doses of Narcan IV given in ED, then placed on drip.   Patient admitted for Acute Respiratory Failure from Opioid Overdose to ICU, downgraded to medical floor.   Today pt feels OK and denies any specific complaints, asking about discharge and one dose of Suboxone.          PAST MEDICAL & SURGICAL HISTORY:  Substance abuse  No significant past surgical history    Tobacco use:   EtOH use:  Illicit drug use:    Living situation:    Allergies:  No Known Allergies      FAMILY HISTORY:  No pertinent family history in first degree relatives    Vital Signs Last 24 Hrs  T(C): 36.1 (30 Sep 2022 14:20), Max: 36.6 (30 Sep 2022 00:08)  T(F): 96.9 (30 Sep 2022 14:20), Max: 97.8 (30 Sep 2022 00:08)  HR: 72 (30 Sep 2022 14:20) (56 - 72)  BP: 131/80 (30 Sep 2022 14:20) (102/53 - 132/57)  BP(mean): 76 (29 Sep 2022 23:00) (73 - 88)  RR: 18 (30 Sep 2022 14:20) (8 - 18)  SpO2: 97% (30 Sep 2022 00:08) (93% - 97%)    Parameters below as of 30 Sep 2022 00:08  Patient On (Oxygen Delivery Method): room air    PHYSICAL EXAM:  GENERAL: NAD, lying in bed comfortably  HEAD:  Atraumatic, Normocephalic  EYES: EOMI, PERRLA, conjunctiva and sclera clear  ENT: Moist mucous membranes  NECK: Supple, No JVD  CHEST/LUNG: decreased BS at bases   HEART: Regular rate and rhythm; No murmurs, rubs, or gallops  ABDOMEN: Bowel sounds present; Soft, Nontender, Nondistended  EXTREMITIES:  2+ Peripheral Pulses, brisk capillary refill. No clubbing, cyanosis, or edema  NERVOUS SYSTEM:  Alert & Oriented X3, speech clear. No deficits   MSK: FROM all 4 extremities, full and equal strength  SKIN: No rashes or lesions    LABS:                        12.6   6.34  )-----------( 169      ( 30 Sep 2022 06:51 )             36.5       09-30    141  |  103  |  13  ----------------------------<  94  3.9   |  26  |  0.9    Ca    7.0<L>      30 Sep 2022 06:51  Mg     1.7     09-30    TPro  5.5<L>  /  Alb  3.0<L>  /  TBili  0.3  /  DBili  x   /  AST  16  /  ALT  11  /  AlkPhos  66  09-30      CARDIAC MARKERS ( 29 Sep 2022 12:10 )  x     / <0.01 ng/mL / x     / x     / x      CARDIAC MARKERS ( 29 Sep 2022 01:40 )  x     / <0.01 ng/mL / x     / x     / x        RADIOLOGY:    < from: CT Cervical Spine No Cont (09.29.22 @ 01:17) >    IMPRESSION:    Head:  1. No CT evidence for acute intracranial pathology.    Maxillofacial:  1. No evidence for acute maxillofacial bone fracture.  2. Multiple dental caries. Limited ability to exclude small chip   fractures of the teeth. Correlation with dental exam suggested.  3. Right supra and periorbital soft tissues along.  4. Pansinus mucosal thickening.    Cervical Spine:  1. No CT evidence for acute cervical spine fracture or subluxation.    < end of copied text >  < from: Xray Pelvis AP only (09.29.22 @ 00:38) >  IMPRESSION:    No acute fracture or dislocation.  Mild bilateral hip degenerative change.  Pelvic phleboliths.      MEDICATIONS  (STANDING):  chlorhexidine 2% Cloths 1 Application(s) Topical daily  enoxaparin Injectable 40 milliGRAM(s) SubCutaneous every 24 hours  influenza   Vaccine 0.5 milliLiter(s) IntraMuscular once  magnesium sulfate  IVPB 2 Gram(s) IV Intermittent every 2 hours  naloxone Injectable 0.08 milliGRAM(s) IV Push once  polyethylene glycol 3350 17 Gram(s) Oral daily  senna 2 Tablet(s) Oral at bedtime  sodium chloride 0.9%. 1000 milliLiter(s) (50 mL/Hr) IV Continuous <Continuous>

## 2022-10-05 DIAGNOSIS — T40.2X1A POISONING BY OTHER OPIOIDS, ACCIDENTAL (UNINTENTIONAL), INITIAL ENCOUNTER: ICD-10-CM
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
## 2022-10-05 DIAGNOSIS — R94.31 ABNORMAL ELECTROCARDIOGRAM [ECG] [EKG]: ICD-10-CM

## 2022-10-05 DIAGNOSIS — Z22.7 LATENT TUBERCULOSIS: ICD-10-CM

## 2022-10-05 DIAGNOSIS — F17.200 NICOTINE DEPENDENCE, UNSPECIFIED, UNCOMPLICATED: ICD-10-CM

## 2022-10-05 DIAGNOSIS — F11.10 OPIOID ABUSE, UNCOMPLICATED: ICD-10-CM
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
## 2022-10-05 DIAGNOSIS — B19.20 UNSPECIFIED VIRAL HEPATITIS C WITHOUT HEPATIC COMA: ICD-10-CM

## 2022-10-05 DIAGNOSIS — J96.00 ACUTE RESPIRATORY FAILURE, UNSPECIFIED WHETHER WITH HYPOXIA OR HYPERCAPNIA: ICD-10-CM

## 2022-10-05 DIAGNOSIS — R04.0 EPISTAXIS: ICD-10-CM

## 2022-12-06 PROBLEM — Z00.00 ENCOUNTER FOR PREVENTIVE HEALTH EXAMINATION: Status: ACTIVE | Noted: 2022-12-06

## 2023-04-29 NOTE — ED PROVIDER NOTE - PATIENT PORTAL LINK FT
no
You can access the FollowMyHealth Patient Portal offered by [facility] by registering at the following website: [email or web address]. By joining [facility]’s FollowMyHealth portal, you will also be able to view your health information using other applications (apps) compatible with our system.

## 2023-05-24 ENCOUNTER — EMERGENCY (EMERGENCY)
Facility: HOSPITAL | Age: 48
LOS: 0 days | Discharge: HOME | End: 2023-05-24
Attending: EMERGENCY MEDICINE
Payer: MEDICAID

## 2023-05-24 VITALS
DIASTOLIC BLOOD PRESSURE: 55 MMHG | RESPIRATION RATE: 15 BRPM | SYSTOLIC BLOOD PRESSURE: 138 MMHG | HEART RATE: 85 BPM | OXYGEN SATURATION: 93 %

## 2023-05-24 VITALS — WEIGHT: 189.6 LBS
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
## 2023-05-24 DIAGNOSIS — Z23 ENCOUNTER FOR IMMUNIZATION: ICD-10-CM

## 2023-05-24 DIAGNOSIS — S00.33XA CONTUSION OF NOSE, INITIAL ENCOUNTER: ICD-10-CM

## 2023-05-24 DIAGNOSIS — T65.91XA TOXIC EFFECT OF UNSPECIFIED SUBSTANCE, ACCIDENTAL (UNINTENTIONAL), INITIAL ENCOUNTER: ICD-10-CM

## 2023-05-24 DIAGNOSIS — R41.82 ALTERED MENTAL STATUS, UNSPECIFIED: ICD-10-CM

## 2023-05-24 DIAGNOSIS — R45.1 RESTLESSNESS AND AGITATION: ICD-10-CM

## 2023-05-24 DIAGNOSIS — F17.200 NICOTINE DEPENDENCE, UNSPECIFIED, UNCOMPLICATED: ICD-10-CM

## 2023-05-24 DIAGNOSIS — S09.90XA UNSPECIFIED INJURY OF HEAD, INITIAL ENCOUNTER: ICD-10-CM
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
## 2023-05-24 LAB
ALBUMIN SERPL ELPH-MCNC: 4.7 G/DL — SIGNIFICANT CHANGE UP (ref 3.5–5.2)
ALP SERPL-CCNC: 112 U/L — SIGNIFICANT CHANGE UP (ref 30–115)
ALT FLD-CCNC: 21 U/L — SIGNIFICANT CHANGE UP (ref 0–41)
ANION GAP SERPL CALC-SCNC: 11 MMOL/L — SIGNIFICANT CHANGE UP (ref 7–14)
ANION GAP SERPL CALC-SCNC: 16 MMOL/L — HIGH (ref 7–14)
APTT BLD: 32.9 SEC — SIGNIFICANT CHANGE UP (ref 27–39.2)
AST SERPL-CCNC: 28 U/L — SIGNIFICANT CHANGE UP (ref 0–41)
BASE EXCESS BLDV CALC-SCNC: -1.4 MMOL/L — SIGNIFICANT CHANGE UP (ref -2–3)
BASOPHILS # BLD AUTO: 0.07 K/UL — SIGNIFICANT CHANGE UP (ref 0–0.2)
BASOPHILS NFR BLD AUTO: 0.4 % — SIGNIFICANT CHANGE UP (ref 0–1)
BILIRUB SERPL-MCNC: 0.4 MG/DL — SIGNIFICANT CHANGE UP (ref 0.2–1.2)
BUN SERPL-MCNC: 27 MG/DL — HIGH (ref 10–20)
BUN SERPL-MCNC: 30 MG/DL — HIGH (ref 10–20)
CA-I SERPL-SCNC: 1.16 MMOL/L — SIGNIFICANT CHANGE UP (ref 1.15–1.33)
CALCIUM SERPL-MCNC: 8.9 MG/DL — SIGNIFICANT CHANGE UP (ref 8.4–10.5)
CALCIUM SERPL-MCNC: 9.7 MG/DL — SIGNIFICANT CHANGE UP (ref 8.4–10.4)
CHLORIDE SERPL-SCNC: 103 MMOL/L — SIGNIFICANT CHANGE UP (ref 98–110)
CHLORIDE SERPL-SCNC: 98 MMOL/L — SIGNIFICANT CHANGE UP (ref 98–110)
CK SERPL-CCNC: 396 U/L — HIGH (ref 0–225)
CO2 SERPL-SCNC: 22 MMOL/L — SIGNIFICANT CHANGE UP (ref 17–32)
CO2 SERPL-SCNC: 23 MMOL/L — SIGNIFICANT CHANGE UP (ref 17–32)
CREAT SERPL-MCNC: 1.3 MG/DL — SIGNIFICANT CHANGE UP (ref 0.7–1.5)
CREAT SERPL-MCNC: 1.7 MG/DL — HIGH (ref 0.7–1.5)
EGFR: 50 ML/MIN/1.73M2 — LOW
EGFR: 69 ML/MIN/1.73M2 — SIGNIFICANT CHANGE UP
EOSINOPHIL # BLD AUTO: 0.15 K/UL — SIGNIFICANT CHANGE UP (ref 0–0.7)
EOSINOPHIL NFR BLD AUTO: 0.9 % — SIGNIFICANT CHANGE UP (ref 0–8)
ETHANOL SERPL-MCNC: <10 MG/DL — SIGNIFICANT CHANGE UP
GAS PNL BLDV: 134 MMOL/L — LOW (ref 136–145)
GAS PNL BLDV: SIGNIFICANT CHANGE UP
GLUCOSE SERPL-MCNC: 131 MG/DL — HIGH (ref 70–99)
GLUCOSE SERPL-MCNC: 96 MG/DL — SIGNIFICANT CHANGE UP (ref 70–99)
HCO3 BLDV-SCNC: 26 MMOL/L — SIGNIFICANT CHANGE UP (ref 22–29)
HCT VFR BLD CALC: 42.1 % — SIGNIFICANT CHANGE UP (ref 42–52)
HCT VFR BLDA CALC: 39 % — SIGNIFICANT CHANGE UP (ref 39–51)
HGB BLD CALC-MCNC: 13.1 G/DL — SIGNIFICANT CHANGE UP (ref 12.6–17.4)
HGB BLD-MCNC: 14.3 G/DL — SIGNIFICANT CHANGE UP (ref 14–18)
IMM GRANULOCYTES NFR BLD AUTO: 0.5 % — HIGH (ref 0.1–0.3)
INR BLD: 1.08 RATIO — SIGNIFICANT CHANGE UP (ref 0.65–1.3)
LACTATE BLDV-MCNC: 1.1 MMOL/L — SIGNIFICANT CHANGE UP (ref 0.5–2)
LYMPHOCYTES # BLD AUTO: 1.56 K/UL — SIGNIFICANT CHANGE UP (ref 1.2–3.4)
LYMPHOCYTES # BLD AUTO: 8.9 % — LOW (ref 20.5–51.1)
MCHC RBC-ENTMCNC: 31.2 PG — HIGH (ref 27–31)
MCHC RBC-ENTMCNC: 34 G/DL — SIGNIFICANT CHANGE UP (ref 32–37)
MCV RBC AUTO: 91.7 FL — SIGNIFICANT CHANGE UP (ref 80–94)
MONOCYTES # BLD AUTO: 1.31 K/UL — HIGH (ref 0.1–0.6)
MONOCYTES NFR BLD AUTO: 7.5 % — SIGNIFICANT CHANGE UP (ref 1.7–9.3)
NEUTROPHILS # BLD AUTO: 14.3 K/UL — HIGH (ref 1.4–6.5)
NEUTROPHILS NFR BLD AUTO: 81.8 % — HIGH (ref 42.2–75.2)
NRBC # BLD: 0 /100 WBCS — SIGNIFICANT CHANGE UP (ref 0–0)
PCO2 BLDV: 56 MMHG — HIGH (ref 42–55)
PH BLDV: 7.28 — LOW (ref 7.32–7.43)
PLATELET # BLD AUTO: 235 K/UL — SIGNIFICANT CHANGE UP (ref 130–400)
PMV BLD: 11.7 FL — HIGH (ref 7.4–10.4)
PO2 BLDV: 40 MMHG — SIGNIFICANT CHANGE UP
POTASSIUM BLDV-SCNC: 4.5 MMOL/L — SIGNIFICANT CHANGE UP (ref 3.5–5.1)
POTASSIUM SERPL-MCNC: 4.2 MMOL/L — SIGNIFICANT CHANGE UP (ref 3.5–5)
POTASSIUM SERPL-MCNC: 4.8 MMOL/L — SIGNIFICANT CHANGE UP (ref 3.5–5)
POTASSIUM SERPL-SCNC: 4.2 MMOL/L — SIGNIFICANT CHANGE UP (ref 3.5–5)
POTASSIUM SERPL-SCNC: 4.8 MMOL/L — SIGNIFICANT CHANGE UP (ref 3.5–5)
PROT SERPL-MCNC: 7.4 G/DL — SIGNIFICANT CHANGE UP (ref 6–8)
PROTHROM AB SERPL-ACNC: 12.4 SEC — SIGNIFICANT CHANGE UP (ref 9.95–12.87)
RBC # BLD: 4.59 M/UL — LOW (ref 4.7–6.1)
RBC # FLD: 13.6 % — SIGNIFICANT CHANGE UP (ref 11.5–14.5)
SAO2 % BLDV: 65.7 % — SIGNIFICANT CHANGE UP
SODIUM SERPL-SCNC: 136 MMOL/L — SIGNIFICANT CHANGE UP (ref 135–146)
SODIUM SERPL-SCNC: 137 MMOL/L — SIGNIFICANT CHANGE UP (ref 135–146)
WBC # BLD: 17.47 K/UL — HIGH (ref 4.8–10.8)
WBC # FLD AUTO: 17.47 K/UL — HIGH (ref 4.8–10.8)

## 2023-05-24 PROCEDURE — 85730 THROMBOPLASTIN TIME PARTIAL: CPT

## 2023-05-24 PROCEDURE — 72125 CT NECK SPINE W/O DYE: CPT | Mod: MA

## 2023-05-24 PROCEDURE — 96375 TX/PRO/DX INJ NEW DRUG ADDON: CPT

## 2023-05-24 PROCEDURE — 85025 COMPLETE CBC W/AUTO DIFF WBC: CPT

## 2023-05-24 PROCEDURE — 85018 HEMOGLOBIN: CPT

## 2023-05-24 PROCEDURE — 71045 X-RAY EXAM CHEST 1 VIEW: CPT

## 2023-05-24 PROCEDURE — 90715 TDAP VACCINE 7 YRS/> IM: CPT

## 2023-05-24 PROCEDURE — 90471 IMMUNIZATION ADMIN: CPT

## 2023-05-24 PROCEDURE — 70450 CT HEAD/BRAIN W/O DYE: CPT | Mod: MA

## 2023-05-24 PROCEDURE — 70450 CT HEAD/BRAIN W/O DYE: CPT | Mod: 26,MA

## 2023-05-24 PROCEDURE — 84132 ASSAY OF SERUM POTASSIUM: CPT

## 2023-05-24 PROCEDURE — 36415 COLL VENOUS BLD VENIPUNCTURE: CPT

## 2023-05-24 PROCEDURE — 93010 ELECTROCARDIOGRAM REPORT: CPT

## 2023-05-24 PROCEDURE — 85014 HEMATOCRIT: CPT

## 2023-05-24 PROCEDURE — 70486 CT MAXILLOFACIAL W/O DYE: CPT | Mod: MA

## 2023-05-24 PROCEDURE — 72170 X-RAY EXAM OF PELVIS: CPT

## 2023-05-24 PROCEDURE — 70486 CT MAXILLOFACIAL W/O DYE: CPT | Mod: 26,MA

## 2023-05-24 PROCEDURE — 84295 ASSAY OF SERUM SODIUM: CPT

## 2023-05-24 PROCEDURE — 82330 ASSAY OF CALCIUM: CPT

## 2023-05-24 PROCEDURE — 96374 THER/PROPH/DIAG INJ IV PUSH: CPT

## 2023-05-24 PROCEDURE — 99285 EMERGENCY DEPT VISIT HI MDM: CPT | Mod: 25

## 2023-05-24 PROCEDURE — 82550 ASSAY OF CK (CPK): CPT

## 2023-05-24 PROCEDURE — 99285 EMERGENCY DEPT VISIT HI MDM: CPT

## 2023-05-24 PROCEDURE — 83605 ASSAY OF LACTIC ACID: CPT

## 2023-05-24 PROCEDURE — 80053 COMPREHEN METABOLIC PANEL: CPT

## 2023-05-24 PROCEDURE — 93005 ELECTROCARDIOGRAM TRACING: CPT

## 2023-05-24 PROCEDURE — 80048 BASIC METABOLIC PNL TOTAL CA: CPT

## 2023-05-24 PROCEDURE — 72170 X-RAY EXAM OF PELVIS: CPT | Mod: 26

## 2023-05-24 PROCEDURE — 71045 X-RAY EXAM CHEST 1 VIEW: CPT | Mod: 26

## 2023-05-24 PROCEDURE — 72125 CT NECK SPINE W/O DYE: CPT | Mod: 26,MA

## 2023-05-24 PROCEDURE — 82962 GLUCOSE BLOOD TEST: CPT

## 2023-05-24 PROCEDURE — 80307 DRUG TEST PRSMV CHEM ANLYZR: CPT

## 2023-05-24 PROCEDURE — 82803 BLOOD GASES ANY COMBINATION: CPT

## 2023-05-24 PROCEDURE — 85610 PROTHROMBIN TIME: CPT

## 2023-05-24 RX ORDER — SODIUM CHLORIDE 9 MG/ML
1000 INJECTION INTRAMUSCULAR; INTRAVENOUS; SUBCUTANEOUS ONCE
Refills: 0 | Status: COMPLETED | OUTPATIENT
Start: 2023-05-24 | End: 2023-05-24

## 2023-05-24 RX ORDER — MAGNESIUM SULFATE 500 MG/ML
2 VIAL (ML) INJECTION ONCE
Refills: 0 | Status: COMPLETED | OUTPATIENT
Start: 2023-05-24 | End: 2023-05-24

## 2023-05-24 RX ORDER — SODIUM CHLORIDE 9 MG/ML
1000 INJECTION, SOLUTION INTRAVENOUS ONCE
Refills: 0 | Status: COMPLETED | OUTPATIENT
Start: 2023-05-24 | End: 2023-05-24

## 2023-05-24 RX ORDER — ONDANSETRON 8 MG/1
4 TABLET, FILM COATED ORAL ONCE
Refills: 0 | Status: COMPLETED | OUTPATIENT
Start: 2023-05-24 | End: 2023-05-24

## 2023-05-24 RX ORDER — TETANUS TOXOID, REDUCED DIPHTHERIA TOXOID AND ACELLULAR PERTUSSIS VACCINE, ADSORBED 5; 2.5; 8; 8; 2.5 [IU]/.5ML; [IU]/.5ML; UG/.5ML; UG/.5ML; UG/.5ML
0.5 SUSPENSION INTRAMUSCULAR ONCE
Refills: 0 | Status: COMPLETED | OUTPATIENT
Start: 2023-05-24 | End: 2023-05-24

## 2023-05-24 RX ADMIN — SODIUM CHLORIDE 1000 MILLILITER(S): 9 INJECTION, SOLUTION INTRAVENOUS at 17:01

## 2023-05-24 RX ADMIN — TETANUS TOXOID, REDUCED DIPHTHERIA TOXOID AND ACELLULAR PERTUSSIS VACCINE, ADSORBED 0.5 MILLILITER(S): 5; 2.5; 8; 8; 2.5 SUSPENSION INTRAMUSCULAR at 13:55

## 2023-05-24 RX ADMIN — Medication 25 GRAM(S): at 16:13

## 2023-05-24 RX ADMIN — Medication 1 MILLIGRAM(S): at 13:50

## 2023-05-24 RX ADMIN — ONDANSETRON 4 MILLIGRAM(S): 8 TABLET, FILM COATED ORAL at 14:30

## 2023-05-24 RX ADMIN — SODIUM CHLORIDE 1000 MILLILITER(S): 9 INJECTION INTRAMUSCULAR; INTRAVENOUS; SUBCUTANEOUS at 13:56

## 2023-05-24 NOTE — ED PROVIDER NOTE - ATTENDING APP SHARED VISIT CONTRIBUTION OF CARE
46 yo M hx of substance use, know hx of ivda, found by EMS on the porch unresponsive.  with a belt and syringe with needle right next to him.  given 1mg IN, folled by 2mg of narcan IM, with becoming awake.  on arrival, pt is very agitated, asking what happened and c/o being cold.    vss, agitated. airway intact, GCS 14, PERRL, 4mm bilat, EOMI, + obvious echymosis to nasal ridge, no active bleeding, MMM, no cervical-thoracic-lumbar spine tenderness, neck supple, CTAB, no crepitus over chest wall, RRR, equal radial pulses bilat, abd soft/nt/nd, no fnd. FROMx4, + trackmarks on L AC fossa.    - control agitation  - labs  - etco2  - imaging  - observation

## 2023-05-24 NOTE — ED ADULT TRIAGE NOTE - CHIEF COMPLAINT QUOTE
Pre-notification from EMS :possible drug overdose. Pt presented to ER agitated and confused, 2mg intranasal versed administered by [facility], additional 2mg IM given by EMS. Abrasions to nose and right hand noted, bruising to L AC area noted. 1:1 sit placed for safety. Pre-notification from EMS :possible drug overdose. Pt presented to ER agitated and confused, 2mg intranasal narcan  administered by [facility] fire, additional 2mg IM given by EMS. Abrasions to nose and right hand noted, bruising to L AC area noted. 1:1 sit placed for safety.

## 2023-05-24 NOTE — ED ADULT TRIAGE NOTE - SOURCE OF INFORMATION
Please schedule CT of the lumbar spine without contrast, consultation with pain management and physical therapy. X-ray of lumbar spine will be performed today.    Once CT complete please call our office.    Follow-up in clinic 4 weeks after completion of all therapy.      
EMS

## 2023-05-24 NOTE — ED PROVIDER NOTE - NS ED ATTENDING STATEMENT MOD
This was a shared visit with the YOSELIN. I reviewed and verified the documentation and independently performed the documented:

## 2023-05-24 NOTE — ED PROVIDER NOTE - CLINICAL SUMMARY MEDICAL DECISION MAKING FREE TEXT BOX
substance abuse, likely opioid. observed. imaging ordered and reviewed by me.   labs were ordered and reviewed by me. substance abuse, likely opioid. observed. imaging ordered and reviewed by me.   labs were ordered and reviewed by me.    pt obserrved in the ED> ate.  clinically not intoxicated any more.  offered narcan kit. refused. offered SBIRT, refused.

## 2023-05-24 NOTE — ED PROVIDER NOTE - NSFOLLOWUPINSTRUCTIONS_ED_ALL_ED_FT
Substance Use Disorder    Substance use disorder happens when a person's repeated use of drugs or alcohol interferes with his or her ability to be productive. This disorder can cause problems with your mental and physical health. It can affect your ability to have healthy relationships, and it can keep you from being able to meet your responsibilities at work, home, or school. It can also lead to addiction.    The most commonly abused substances include:    Alcohol.  Tobacco.  Marijuana.  Stimulants, such as cocaine and methamphetamine.  Hallucinogens, such as LSD and PCP.  Opioids, such as some prescription pain medicines and heroin.    What are the causes?  This condition may develop due to social, psychological, or physical reasons. Causes include:    Stress.  Abuse.  Peer pressure.  Anxiety.  Depression.    What increases the risk?  This condition is more likely to develop in people who:    Are stressed.  Have been abused.  Have a mental health disorder, such as depression.  Are born with certain genes.    What are the signs or symptoms?  Symptoms of this condition include:    Using the substance for longer periods of time or at a higher dosage than what is normal or intended.  Having a lasting desire to use the substance.  Being unable to slow down or stop your use of the substance.  Spending an abnormal amount of time seeking the substance, using the substance, or recovering from using the substance.  Craving the substance.  Substance use that:    Interferes with your work, school, or home life.  Interferes with your personal and social relationships.  Makes you give up activities that you once enjoyed or found important.    Using the substance even though:    You know it is dangerous or bad for your health.  You know it is causing problems in your life.    Needing more and more of the substance to get the same effect (developing tolerance).  Experiencing physical symptoms if you do not use the substance (withdrawal).  Using the substance to avoid withdrawal.    How is this diagnosed?  This condition may be diagnosed based on:    Your history of substance use.  The way in which substance abuse affects your life.  Having at least two symptoms of substance use disorder within a 12-month period.    Your health care provider may also test your blood and urine for alcohol and drugs.    How is this treated?  ImageThis condition may be treated by:    Stopping substance use safely. This may require taking medicines and being closely observed for several days.  Taking part in group and individual counseling from mental health providers who help people with substance use disorder.  Staying at a residential treatment center for several days or weeks.  Attending daily counseling sessions at a treatment center.  Taking medicine as told by your health care provider:    To ease symptoms and prevent complications during withdrawal.  To treat other mental health issues, such as depression or anxiety.  To block cravings by causing the same effects as the substance.  To block the effects of the substance or replace good sensations with unpleasant ones.    Going to a support group to share your experience with others who are going through the same thing. These groups are an important part of long-term recovery for many people. They include 12-step programs like Alcoholics Anonymous and Narcotics Anonymous.    Recovery can be a long process. Many people who undergo treatment start using the substance again after stopping. This is called a relapse. If you have a relapse, that does not mean that treatment will not work.    Follow these instructions at home:  Take over-the-counter and prescription medicines only as told by your health care provider.  Do not use any drugs or alcohol.  Keep all follow-up visits as told by your health care provider. This is important. This includes continuing to work with therapists, health care providers, and support groups.  Contact a health care provider if:  You cannot take your medicines as told.  Your symptoms get worse.  You have trouble resisting the urge to use drugs or alcohol.  Get help right away if:  You have serious thoughts about hurting yourself or someone else.  You have a relapse.  This information is not intended to replace advice given to you by your health care provider. Make sure you discuss any questions you have with your health care provider.    Please check this website for help finding local Buprenorphine (Suboxone) providers or to find out if your PMD can prescribe Buprenorphine (Suboxone).  https://[email or web address].gov/medication-assisted-treatment/practitioner-program-data/treatment-practitioner-

## 2023-05-24 NOTE — ED ADULT NURSE NOTE - CHIEF COMPLAINT QUOTE
pt BIBA for possible overdose. Pt was found on the ground with needles. Narcan given on the scene. Hx drug iv use.

## 2023-05-24 NOTE — ED PROVIDER NOTE - PROGRESS NOTE DETAILS
Authored by Dr. Mitchell:  Patient required anxiety lysis for agitation on arrival.  Once IV was placed IV 1 mg of Ativan administered.  Patient is,.  Left to CT scan. Authored by Dr. Mitchell: asleep. etco2 monitor 37.  cont observation. JS: Patient more awake and oriented. Feeling remorseful regarding today's IV drug use. CT scans of head, neck and max face negative. EKG significant for long QT, will administer magnesium. Remaining labs pending. Will continue to monitor and reassess. CK: patient more awake and comfortable, cooperative. cmp repeated with improvement of creatinine.

## 2023-05-24 NOTE — ED PROVIDER NOTE - NSFOLLOWUPCLINICS_GEN_ALL_ED_FT
[facility]  Detox Mgmt  [address] [place] [address]  Phone: [phone]  Fax:   Follow Up Time: 1-3 Days    [facility]  Medicine  [address], [place]   Phone: [phone]  Fax:   Follow Up Time: 1-3 Days

## 2023-05-24 NOTE — ED ADULT NURSE REASSESSMENT NOTE - NS ED NURSE REASSESS COMMENT FT1
received report. Pt presented to ed s/p overdose. Pt with bedside monitor in place. Pt awake with periods of drowsiness. Awaiting urine sample at this time. Bed alarm and safety precautions in place.

## 2023-05-24 NOTE — ED PROVIDER NOTE - OBJECTIVE STATEMENT
47 year old male, past medical history substance use disorder, bib ems with altered mental status. patient was found to be on the ground on the grass with bruising/bleeding to nare with open syringe next to him. patient was given narcan 1mg intranasal followed by 2mg IM. patient with increase in mentation, with persistent agitation. 47 year old male, past medical history substance use disorder, bib ems with altered mental status. patient was found to be on the ground on the grass with bruising/bleeding to nare with open syringe next to him. patient was given narcan 1mg intranasal followed by 2mg IM. patient with increase in mentation, with persistent agitation. patient arrives to ed agitated, confused. no fever, shortness of breath, cough, diarrhea, rash.

## 2023-05-24 NOTE — ED PROVIDER NOTE - PHYSICAL EXAMINATION
CONSTITUTIONAL: agitated male  SKIN: skin exam is warm and dry  HEAD: Normocephalic; atraumatic  EYES: PERRL, EOMI, conjunctiva and sclera clear.  ENT: MMM, +dried epistaxis to right nare, no active bleeding   NECK: ROM intact  CARD: S1, S2 normal, no murmur  RESP:  Good air movement bilaterally  ABD: soft; non-distended; non-tender   EXT: Normal ROM   NEURO: awake, alert, following commands, oriented, grossly unremarkable. No Focal deficits. GCS 15.   PSYCH: agitated intoxicated

## 2023-05-24 NOTE — ED PROVIDER NOTE - PATIENT PORTAL LINK FT
You can access the FollowMyHealth Patient Portal offered by [facility] by registering at the following website: [email or web address]. By joining [employer]’s FollowMyHealth portal, you will also be able to view your health information using other applications (apps) compatible with our system.

## 2023-05-24 NOTE — ED PROVIDER NOTE - CARE PLAN
Principal Discharge DX:	Drug overdose   1 Principal Discharge DX:	Drug overdose  Secondary Diagnosis:	Closed head injury

## 2023-05-24 NOTE — ED ADULT NURSE NOTE - OBJECTIVE STATEMENT
pt BIBA for possible overdose. Pt was found outside on the ground with needles. Narcan given on the scene. Hx drug iv use.

## 2023-05-25 RX ORDER — AZITHROMYCIN 500 MG/1
1 TABLET, FILM COATED ORAL
Qty: 6 | Refills: 0
Start: 2023-05-25 | End: 2023-05-30

## 2023-05-31 ENCOUNTER — EMERGENCY (EMERGENCY)
Facility: HOSPITAL | Age: 48
LOS: 0 days | Discharge: HOME | End: 2023-05-31
Attending: EMERGENCY MEDICINE
Payer: MEDICAID

## 2023-05-31 VITALS
WEIGHT: 240.08 LBS | DIASTOLIC BLOOD PRESSURE: 89 MMHG | HEART RATE: 83 BPM | SYSTOLIC BLOOD PRESSURE: 145 MMHG | OXYGEN SATURATION: 98 % | RESPIRATION RATE: 16 BRPM | TEMPERATURE: 98 F

## 2023-05-31 DIAGNOSIS — M79.631 PAIN IN RIGHT FOREARM: ICD-10-CM

## 2023-05-31 DIAGNOSIS — Z87.898 PERSONAL HISTORY OF OTHER SPECIFIED CONDITIONS: ICD-10-CM

## 2023-05-31 DIAGNOSIS — L02.411 CUTANEOUS ABSCESS OF RIGHT AXILLA: ICD-10-CM

## 2023-05-31 PROCEDURE — 99283 EMERGENCY DEPT VISIT LOW MDM: CPT | Mod: 25

## 2023-05-31 PROCEDURE — 10060 I&D ABSCESS SIMPLE/SINGLE: CPT

## 2023-05-31 PROCEDURE — 12001 RPR S/N/AX/GEN/TRNK 2.5CM/<: CPT

## 2023-05-31 NOTE — ED PROCEDURE NOTE - NS ED PROCEDURE ASSISTED BY
Addended by: RADHA WERNER on: 7/14/2021 03:08 PM     Modules accepted: Orders    
Supervision was available

## 2023-05-31 NOTE — ED PROVIDER NOTE - PHYSICAL EXAMINATION
Physical Exam    Vital Signs: I have reviewed the initial vital signs.  Constitutional: well-nourished, appears stated age, no acute distress  Eyes: Conjunctiva pink, Sclera clear, PERRLA, EOMI.  Musculoskeletal: supple neck, no lower extremity edema, no midline tenderness  Integumentary: warm, dry, no rash. [id number] abscess on the R forearm swelling, fluctuance and surrounding erythema and no streaking   Neurologic: awake, alert, cranial nerves II-XII grossly intact, extremities’ motor and sensory functions grossly intact  Psychiatric: appropriate mood, appropriate affect

## 2023-05-31 NOTE — ED PROVIDER NOTE - NSFOLLOWUPCLINICS_GEN_ALL_ED_FT
[facility]  Medicine  [address], [place]   Phone: [phone]  Fax:   Follow Up Time: 1-3 Days    [facility]  Surgery  [address] [place] [address]  Phone: [phone]  Fax:   Follow Up Time: 1-3 Days

## 2023-05-31 NOTE — ED PROVIDER NOTE - CLINICAL SUMMARY MEDICAL DECISION MAKING FREE TEXT BOX
Patient presenting with abscess to right forearm, history of abscess in the past as well as IV drug abuse.  Otherwise on arrival patient afebrile, hemodynamically stable, neurovascular intact.  Area of fluctuance noted to the right forearm, with surrounding induration.  Performed I&D in ED with successful drainage of pus.  Given the above, will discharge home with PO abx and outpatient follow up. Patient agreeable with plan. Agrees to return to ED for any new or worsening symptoms.

## 2023-05-31 NOTE — ED PROVIDER NOTE - OBJECTIVE STATEMENT
Pt is a 47 year old male with PMH IVDA last use 7 days prior in R forearm, comes in for R forearm pain concern for abscess with hx of abscess in past. Pain is moderate, non radiating with no alleviating factors,. Denies nay DC from abscess. Denies any fever, chills bodyaches, chest pain, sob, abdomianl pain, NVCD

## 2023-05-31 NOTE — ED ADULT NURSE NOTE - NSFALLUNIVINTERV_ED_ALL_ED
Bed/Stretcher in lowest position, wheels locked, appropriate side rails in place/Call bell, personal items and telephone in reach/Instruct patient to call for assistance before getting out of bed/chair/stretcher/Non-slip footwear applied when patient is off stretcher/[place] to call system/Physically safe environment - no spills, clutter or unnecessary equipment/Purposeful proactive rounding/Room/bathroom lighting operational, light cord in reach

## 2023-05-31 NOTE — ED PROVIDER NOTE - NS ED ROS FT
Constitutional: (-) fever  Eyes/ENT: (-) blurry vision, (-) epistaxis  Cardiovascular: (-) chest pain, (-) syncope  Respiratory: (-) cough, (-) shortness of breath  Gastrointestinal: (-) vomiting, (-) diarrhea  Musculoskeletal: (-) neck pain, (-) back pain, (-) joint pain  Integumentary: (-) rash, (-) edema (+) abscess   Neurological: (-) headache, (-) altered mental status  Psychiatric: (-) hallucinations  Allergic/Immunologic: (-) pruritus

## 2023-05-31 NOTE — ED ADULT TRIAGE NOTE - CHIEF COMPLAINT QUOTE
Abscess on right arm pt believes it is from heroine injection. Pt reports abscess started 1 week ago. Pt denies abx use.

## 2023-05-31 NOTE — ED PROVIDER NOTE - PATIENT PORTAL LINK FT
You can access the FollowMyHealth Patient Portal offered by [facility] by registering at the following website: [email or web address]. By joining [employer]’s FollowMyHealth portal, you will also be able to view your health information using other applications (apps) compatible with our system.

## 2023-05-31 NOTE — ED PROVIDER NOTE - NSPTACCESSSVCSAPPTDETAILS_ED_ALL_ED_FT
Medicine Clinic within 1 week.     Our Emergency Department Referral Coordinators will be reaching out to you in the next 24-48 hours from 9:00am to 5:00pm with a follow up appointment. Please expect a phone call from the hospital in that time frame. If you do not receive a call or if you have any questions or concerns, you can reach them at   [phone]

## 2023-06-05 NOTE — ED ADULT TRIAGE NOTE - HEART RATE (BEATS/MIN)
90 Consent (Temporal Branch)/Introductory Paragraph: The rationale for Mohs was explained to the patient and consent was obtained. The risks, benefits and alternatives to therapy were discussed in detail. Specifically, the risks of damage to the temporal branch of the facial nerve, infection, scarring, bleeding, prolonged wound healing, incomplete removal, allergy to anesthesia, and recurrence were addressed. Prior to the procedure, the treatment site was clearly identified and confirmed by the patient. All components of Universal Protocol/PAUSE Rule completed.

## 2024-01-04 NOTE — ED ADULT NURSE NOTE - CHIEF COMPLAINT QUOTE
fell 10 feet off scaffold.  hit head.  loc.  no blood thinners Detail Level: Detailed Depth Of Biopsy: dermis Was A Bandage Applied: Yes Size Of Lesion In Cm: 1 X Size Of Lesion In Cm: 0 Biopsy Type: H and E Biopsy Method: Personna blade Anesthesia Type: 1% lidocaine with epinephrine Hemostasis: Electrocautery Wound Care: Vaseline Dressing: Band-Aid Destruction After The Procedure: No Type Of Destruction Used: Electrodesiccation Curettage Text: The wound bed was treated with curettage after the biopsy was performed. Cryotherapy Text: The wound bed was treated with cryotherapy after the biopsy was performed. Electrodesiccation Text: The wound bed was treated with electrodesiccation after the biopsy was performed. Electrodesiccation And Curettage Text: The wound bed was treated with electrodesiccation and curettage after the biopsy was performed. Silver Nitrate Text: The wound bed was treated with silver nitrate after the biopsy was performed. Lab: [id number] Lab Facility:  Consent: Written consent was obtained and risks were reviewed including but not limited to scarring, infection, bleeding, scabbing, incomplete removal, nerve damage and allergy to anesthesia. Post-Care Instructions: I reviewed with the patient in detail post-care instructions. Patient is to keep the biopsy site dry overnight, and then apply bacitracin/petroleum  twice daily until healed. Patient may apply hydrogen peroxide soaks to remove any crusting. Notification Instructions: Patient will be notified of biopsy results. However, patient instructed to call the office if not contacted within 2 weeks. Billing Type: Third-Party Bill Information: Selecting Yes will display possible errors in your note based on the variables you have selected. This validation is only offered as a suggestion for you. PLEASE NOTE THAT THE VALIDATION TEXT WILL BE REMOVED WHEN YOU FINALIZE YOUR NOTE. IF YOU WANT TO FAX A PRELIMINARY NOTE YOU WILL NEED TO TOGGLE THIS TO 'NO' IF YOU DO NOT WANT IT IN YOUR FAXED NOTE.

## 2024-06-21 NOTE — PROGRESS NOTE ADULT - ASSESSMENT
Writer took incoming call back from Mom. Medication is Venafaxine. Dr. Carolina's message relayed to Apple (Mom).     Per Mom, she will try to work out something with Dr. Scott today. If she cannot get something worked out with Dr. Scott, she will call clinic back to have Dr. Carolina help fill patient's medication prior to seeing Dr. Carolina. They will then schedule visit to see Dr. Carolina on July 10.     KENYA HadleyN, RN   [facility]       Mr. Pierce is a 50 YO man who came with opioid overdose. Patient has a history of opioid abuse and was attending inpatient rehab 6 months ago, was clean, and then relapsed. Patient also reportedly tested + for Tb in rehab but never received treatment according to sister.    #Acute respiratory failure 2/2 to opioid overdose  - Patient was admitted on a naloxone drip, weaned off.   - Currently off the drip, RR is 16, on RA, satting well. not in distress.   - FU addiction medicine for management of opioid abuse     #ST depression on EKG   - FU EKG     #Hx of latent TB  - Patient did not receive treatment?   - FU with primary care OP for possile treatment   - CXR is negative, patient is asymptomatic     # Misc  - DVT Prophylaxis: Lovenox  - Diet: Diet, Regular  - Code Status: Full   - Pending: Addiction medicine FU   Mr. Pierce is a 50 YO man who came with opioid overdose. Patient has a history of opioid abuse and was attending inpatient rehab 6 months ago, was clean, and then relapsed. Patient also reportedly tested + for Tb in rehab but never received treatment according to sister.    #Acute respiratory failure 2/2 to opioid overdose  - Patient was admitted on a naloxone drip, weaned off.   - Currently off the drip, RR is 16, on RA, satting well. not in distress.   - FU addiction medicine for management of opioid abuse     #ST depression on EKG   - FU EKG     #Hx of latent TB  - Patient did not receive treatment?   - FU with primary care OP for possile treatment   - CXR is negative, patient is asymptomatic     #HCV infection  - Reactive HCV testing  - Monitor LFTs  - FU OP with GI    # Misc  - DVT Prophylaxis: Lovenox  - Diet: Diet, Regular  - Code Status: Full   - Pending: Addiction medicine FU

## 2024-07-09 ENCOUNTER — EMERGENCY (EMERGENCY)
Facility: HOSPITAL | Age: 49
LOS: 0 days | Discharge: HOME | End: 2024-07-10
Attending: EMERGENCY MEDICINE
Payer: MEDICAID

## 2024-07-09 VITALS
HEART RATE: 71 BPM | TEMPERATURE: 98 F | OXYGEN SATURATION: 96 % | WEIGHT: 199.96 LBS | DIASTOLIC BLOOD PRESSURE: 69 MMHG | SYSTOLIC BLOOD PRESSURE: 111 MMHG | RESPIRATION RATE: 18 BRPM

## 2024-07-09 DIAGNOSIS — Z87.891 PERSONAL HISTORY OF NICOTINE DEPENDENCE: ICD-10-CM

## 2024-07-09 DIAGNOSIS — R07.81 PLEURODYNIA: ICD-10-CM

## 2024-07-09 LAB
ALBUMIN SERPL ELPH-MCNC: 3.9 G/DL — SIGNIFICANT CHANGE UP (ref 3.5–5.2)
ALP SERPL-CCNC: 84 U/L — SIGNIFICANT CHANGE UP (ref 30–115)
ALT FLD-CCNC: 28 U/L — SIGNIFICANT CHANGE UP (ref 0–41)
ANION GAP SERPL CALC-SCNC: 9 MMOL/L — SIGNIFICANT CHANGE UP (ref 7–14)
AST SERPL-CCNC: 25 U/L — SIGNIFICANT CHANGE UP (ref 0–41)
BASOPHILS # BLD AUTO: 0.06 K/UL — SIGNIFICANT CHANGE UP (ref 0–0.2)
BASOPHILS NFR BLD AUTO: 0.8 % — SIGNIFICANT CHANGE UP (ref 0–1)
BILIRUB SERPL-MCNC: <0.2 MG/DL — SIGNIFICANT CHANGE UP (ref 0.2–1.2)
BUN SERPL-MCNC: 32 MG/DL — HIGH (ref 10–20)
CALCIUM SERPL-MCNC: 9.9 MG/DL — SIGNIFICANT CHANGE UP (ref 8.4–10.5)
CHLORIDE SERPL-SCNC: 103 MMOL/L — SIGNIFICANT CHANGE UP (ref 98–110)
CO2 SERPL-SCNC: 29 MMOL/L — SIGNIFICANT CHANGE UP (ref 17–32)
CREAT SERPL-MCNC: 1.2 MG/DL — SIGNIFICANT CHANGE UP (ref 0.7–1.5)
EGFR: 75 ML/MIN/1.73M2 — SIGNIFICANT CHANGE UP
EOSINOPHIL # BLD AUTO: 0.36 K/UL — SIGNIFICANT CHANGE UP (ref 0–0.7)
EOSINOPHIL NFR BLD AUTO: 4.8 % — SIGNIFICANT CHANGE UP (ref 0–8)
GLUCOSE SERPL-MCNC: 95 MG/DL — SIGNIFICANT CHANGE UP (ref 70–99)
HCT VFR BLD CALC: 35.8 % — LOW (ref 42–52)
HGB BLD-MCNC: 12.2 G/DL — LOW (ref 14–18)
IMM GRANULOCYTES NFR BLD AUTO: 0.1 % — SIGNIFICANT CHANGE UP (ref 0.1–0.3)
LYMPHOCYTES # BLD AUTO: 2.71 K/UL — SIGNIFICANT CHANGE UP (ref 1.2–3.4)
LYMPHOCYTES # BLD AUTO: 35.9 % — SIGNIFICANT CHANGE UP (ref 20.5–51.1)
MAGNESIUM SERPL-MCNC: 1.9 MG/DL — SIGNIFICANT CHANGE UP (ref 1.8–2.4)
MCHC RBC-ENTMCNC: 30.2 PG — SIGNIFICANT CHANGE UP (ref 27–31)
MCHC RBC-ENTMCNC: 34.1 G/DL — SIGNIFICANT CHANGE UP (ref 32–37)
MCV RBC AUTO: 88.6 FL — SIGNIFICANT CHANGE UP (ref 80–94)
MONOCYTES # BLD AUTO: 0.63 K/UL — HIGH (ref 0.1–0.6)
MONOCYTES NFR BLD AUTO: 8.3 % — SIGNIFICANT CHANGE UP (ref 1.7–9.3)
NEUTROPHILS # BLD AUTO: 3.78 K/UL — SIGNIFICANT CHANGE UP (ref 1.4–6.5)
NEUTROPHILS NFR BLD AUTO: 50.1 % — SIGNIFICANT CHANGE UP (ref 42.2–75.2)
NRBC # BLD: 0 /100 WBCS — SIGNIFICANT CHANGE UP (ref 0–0)
NT-PROBNP SERPL-SCNC: 133 PG/ML — SIGNIFICANT CHANGE UP (ref 0–300)
PLATELET # BLD AUTO: 279 K/UL — SIGNIFICANT CHANGE UP (ref 130–400)
PMV BLD: 11.1 FL — HIGH (ref 7.4–10.4)
POTASSIUM SERPL-MCNC: 5.6 MMOL/L — HIGH (ref 3.5–5)
POTASSIUM SERPL-SCNC: 5.6 MMOL/L — HIGH (ref 3.5–5)
PROT SERPL-MCNC: 6.5 G/DL — SIGNIFICANT CHANGE UP (ref 6–8)
RBC # BLD: 4.04 M/UL — LOW (ref 4.7–6.1)
RBC # FLD: 13.1 % — SIGNIFICANT CHANGE UP (ref 11.5–14.5)
SODIUM SERPL-SCNC: 141 MMOL/L — SIGNIFICANT CHANGE UP (ref 135–146)
TROPONIN T, HIGH SENSITIVITY RESULT: <6 NG/L — SIGNIFICANT CHANGE UP (ref 6–21)
WBC # BLD: 7.55 K/UL — SIGNIFICANT CHANGE UP (ref 4.8–10.8)
WBC # FLD AUTO: 7.55 K/UL — SIGNIFICANT CHANGE UP (ref 4.8–10.8)

## 2024-07-09 PROCEDURE — 71046 X-RAY EXAM CHEST 2 VIEWS: CPT

## 2024-07-09 PROCEDURE — 83735 ASSAY OF MAGNESIUM: CPT

## 2024-07-09 PROCEDURE — 71046 X-RAY EXAM CHEST 2 VIEWS: CPT | Mod: 26

## 2024-07-09 PROCEDURE — 74177 CT ABD & PELVIS W/CONTRAST: CPT | Mod: MC

## 2024-07-09 PROCEDURE — 99285 EMERGENCY DEPT VISIT HI MDM: CPT | Mod: 25

## 2024-07-09 PROCEDURE — 85025 COMPLETE CBC W/AUTO DIFF WBC: CPT

## 2024-07-09 PROCEDURE — 36415 COLL VENOUS BLD VENIPUNCTURE: CPT

## 2024-07-09 PROCEDURE — 99285 EMERGENCY DEPT VISIT HI MDM: CPT

## 2024-07-09 PROCEDURE — 80053 COMPREHEN METABOLIC PANEL: CPT

## 2024-07-09 PROCEDURE — 74177 CT ABD & PELVIS W/CONTRAST: CPT | Mod: 26,MC

## 2024-07-09 PROCEDURE — 83880 ASSAY OF NATRIURETIC PEPTIDE: CPT

## 2024-07-09 PROCEDURE — 71275 CT ANGIOGRAPHY CHEST: CPT | Mod: 26,MC

## 2024-07-09 PROCEDURE — 84484 ASSAY OF TROPONIN QUANT: CPT

## 2024-07-09 PROCEDURE — 71275 CT ANGIOGRAPHY CHEST: CPT | Mod: MC

## 2024-07-09 PROCEDURE — 96374 THER/PROPH/DIAG INJ IV PUSH: CPT | Mod: XU

## 2024-07-09 PROCEDURE — 93005 ELECTROCARDIOGRAM TRACING: CPT

## 2024-07-09 PROCEDURE — 93010 ELECTROCARDIOGRAM REPORT: CPT

## 2024-07-09 RX ORDER — SODIUM ZIRCONIUM CYCLOSILICATE 10 G/10G
10 POWDER, FOR SUSPENSION ORAL ONCE
Refills: 0 | Status: COMPLETED | OUTPATIENT
Start: 2024-07-09 | End: 2024-07-09

## 2024-07-09 RX ADMIN — SODIUM ZIRCONIUM CYCLOSILICATE 10 GRAM(S): 10 POWDER, FOR SUSPENSION ORAL at 23:29

## 2024-07-10 VITALS
SYSTOLIC BLOOD PRESSURE: 121 MMHG | DIASTOLIC BLOOD PRESSURE: 70 MMHG | OXYGEN SATURATION: 98 % | HEART RATE: 76 BPM | RESPIRATION RATE: 18 BRPM

## 2024-07-10 RX ORDER — MAGNESIUM, ALUMINUM HYDROXIDE 400-400
30 TABLET,CHEWABLE ORAL ONCE
Refills: 0 | Status: COMPLETED | OUTPATIENT
Start: 2024-07-10 | End: 2024-07-10

## 2024-07-10 RX ORDER — FAMOTIDINE 40 MG
20 TABLET ORAL ONCE
Refills: 0 | Status: COMPLETED | OUTPATIENT
Start: 2024-07-10 | End: 2024-07-10

## 2024-07-10 RX ORDER — KETOROLAC TROMETHAMINE 30 MG/ML
15 INJECTION, SOLUTION INTRAMUSCULAR ONCE
Refills: 0 | Status: DISCONTINUED | OUTPATIENT
Start: 2024-07-10 | End: 2024-07-10

## 2024-07-10 RX ADMIN — KETOROLAC TROMETHAMINE 15 MILLIGRAM(S): 30 INJECTION, SOLUTION INTRAMUSCULAR at 00:26

## 2024-07-10 RX ADMIN — Medication 30 MILLILITER(S): at 00:25

## 2024-07-10 RX ADMIN — Medication 20 MILLIGRAM(S): at 00:25

## 2024-07-14 ENCOUNTER — EMERGENCY (EMERGENCY)
Facility: HOSPITAL | Age: 49
LOS: 0 days | Discharge: HOME | End: 2024-07-14
Attending: EMERGENCY MEDICINE
Payer: MEDICAID

## 2024-07-14 VITALS
SYSTOLIC BLOOD PRESSURE: 142 MMHG | OXYGEN SATURATION: 96 % | DIASTOLIC BLOOD PRESSURE: 84 MMHG | HEART RATE: 72 BPM | RESPIRATION RATE: 18 BRPM

## 2024-07-14 VITALS
OXYGEN SATURATION: 96 % | WEIGHT: 199.96 LBS | RESPIRATION RATE: 18 BRPM | DIASTOLIC BLOOD PRESSURE: 119 MMHG | HEART RATE: 67 BPM | HEIGHT: 71 IN | SYSTOLIC BLOOD PRESSURE: 172 MMHG | TEMPERATURE: 97 F

## 2024-07-14 DIAGNOSIS — G51.0 BELL'S PALSY: ICD-10-CM

## 2024-07-14 DIAGNOSIS — R10.10 UPPER ABDOMINAL PAIN, UNSPECIFIED: ICD-10-CM

## 2024-07-14 PROCEDURE — 93005 ELECTROCARDIOGRAM TRACING: CPT

## 2024-07-14 PROCEDURE — 99283 EMERGENCY DEPT VISIT LOW MDM: CPT

## 2024-07-14 PROCEDURE — 99284 EMERGENCY DEPT VISIT MOD MDM: CPT

## 2024-07-14 PROCEDURE — 93010 ELECTROCARDIOGRAM REPORT: CPT

## 2024-07-14 RX ORDER — PREDNISONE 10 MG/1
60 TABLET ORAL ONCE
Refills: 0 | Status: COMPLETED | OUTPATIENT
Start: 2024-07-14 | End: 2024-07-14

## 2024-07-14 RX ORDER — ACYCLOVIR SODIUM 50 MG/ML
1 VIAL (ML) INTRAVENOUS
Qty: 35 | Refills: 0
Start: 2024-07-14 | End: 2024-07-20

## 2024-07-14 RX ORDER — PREDNISONE 10 MG/1
2 TABLET ORAL
Qty: 10 | Refills: 0
Start: 2024-07-14 | End: 2024-07-18

## 2024-07-14 RX ORDER — ACYCLOVIR SODIUM 50 MG/ML
800 VIAL (ML) INTRAVENOUS ONCE
Refills: 0 | Status: COMPLETED | OUTPATIENT
Start: 2024-07-14 | End: 2024-07-14

## 2024-07-14 RX ADMIN — PREDNISONE 60 MILLIGRAM(S): 10 TABLET ORAL at 12:36

## 2024-07-14 RX ADMIN — Medication 800 MILLIGRAM(S): at 12:36

## 2024-07-20 ENCOUNTER — EMERGENCY (EMERGENCY)
Facility: HOSPITAL | Age: 49
LOS: 0 days | Discharge: HOME | End: 2024-07-20
Attending: EMERGENCY MEDICINE
Payer: MEDICAID

## 2024-07-20 VITALS
HEIGHT: 71 IN | HEART RATE: 83 BPM | DIASTOLIC BLOOD PRESSURE: 94 MMHG | SYSTOLIC BLOOD PRESSURE: 153 MMHG | OXYGEN SATURATION: 99 % | TEMPERATURE: 97 F | RESPIRATION RATE: 18 BRPM | WEIGHT: 199.96 LBS

## 2024-07-20 VITALS
TEMPERATURE: 98 F | SYSTOLIC BLOOD PRESSURE: 148 MMHG | DIASTOLIC BLOOD PRESSURE: 87 MMHG | HEART RATE: 85 BPM | OXYGEN SATURATION: 100 % | RESPIRATION RATE: 18 BRPM

## 2024-07-20 DIAGNOSIS — K59.00 CONSTIPATION, UNSPECIFIED: ICD-10-CM

## 2024-07-20 DIAGNOSIS — R14.0 ABDOMINAL DISTENSION (GASEOUS): ICD-10-CM

## 2024-07-20 DIAGNOSIS — F19.10 OTHER PSYCHOACTIVE SUBSTANCE ABUSE, UNCOMPLICATED: ICD-10-CM

## 2024-07-20 DIAGNOSIS — R10.9 UNSPECIFIED ABDOMINAL PAIN: ICD-10-CM

## 2024-07-20 LAB
ALBUMIN SERPL ELPH-MCNC: 4.3 G/DL — SIGNIFICANT CHANGE UP (ref 3.5–5.2)
ALP SERPL-CCNC: 83 U/L — SIGNIFICANT CHANGE UP (ref 30–115)
ALT FLD-CCNC: 24 U/L — SIGNIFICANT CHANGE UP (ref 0–41)
ANION GAP SERPL CALC-SCNC: 11 MMOL/L — SIGNIFICANT CHANGE UP (ref 7–14)
AST SERPL-CCNC: 17 U/L — SIGNIFICANT CHANGE UP (ref 0–41)
BASOPHILS # BLD AUTO: 0.04 K/UL — SIGNIFICANT CHANGE UP (ref 0–0.2)
BASOPHILS NFR BLD AUTO: 0.5 % — SIGNIFICANT CHANGE UP (ref 0–1)
BILIRUB SERPL-MCNC: 0.4 MG/DL — SIGNIFICANT CHANGE UP (ref 0.2–1.2)
BUN SERPL-MCNC: 19 MG/DL — SIGNIFICANT CHANGE UP (ref 10–20)
CALCIUM SERPL-MCNC: 9.6 MG/DL — SIGNIFICANT CHANGE UP (ref 8.4–10.5)
CHLORIDE SERPL-SCNC: 99 MMOL/L — SIGNIFICANT CHANGE UP (ref 98–110)
CO2 SERPL-SCNC: 27 MMOL/L — SIGNIFICANT CHANGE UP (ref 17–32)
CREAT SERPL-MCNC: 1.1 MG/DL — SIGNIFICANT CHANGE UP (ref 0.7–1.5)
EGFR: 83 ML/MIN/1.73M2 — SIGNIFICANT CHANGE UP
EOSINOPHIL # BLD AUTO: 0.22 K/UL — SIGNIFICANT CHANGE UP (ref 0–0.7)
EOSINOPHIL NFR BLD AUTO: 2.6 % — SIGNIFICANT CHANGE UP (ref 0–8)
GLUCOSE SERPL-MCNC: 102 MG/DL — HIGH (ref 70–99)
HCT VFR BLD CALC: 38.5 % — LOW (ref 42–52)
HGB BLD-MCNC: 13.3 G/DL — LOW (ref 14–18)
IMM GRANULOCYTES NFR BLD AUTO: 0.2 % — SIGNIFICANT CHANGE UP (ref 0.1–0.3)
LACTATE SERPL-SCNC: 1.1 MMOL/L — SIGNIFICANT CHANGE UP (ref 0.7–2)
LIDOCAIN IGE QN: 10 U/L — SIGNIFICANT CHANGE UP (ref 7–60)
LYMPHOCYTES # BLD AUTO: 2.84 K/UL — SIGNIFICANT CHANGE UP (ref 1.2–3.4)
LYMPHOCYTES # BLD AUTO: 33.8 % — SIGNIFICANT CHANGE UP (ref 20.5–51.1)
MCHC RBC-ENTMCNC: 30 PG — SIGNIFICANT CHANGE UP (ref 27–31)
MCHC RBC-ENTMCNC: 34.5 G/DL — SIGNIFICANT CHANGE UP (ref 32–37)
MCV RBC AUTO: 86.9 FL — SIGNIFICANT CHANGE UP (ref 80–94)
MONOCYTES # BLD AUTO: 0.59 K/UL — SIGNIFICANT CHANGE UP (ref 0.1–0.6)
MONOCYTES NFR BLD AUTO: 7 % — SIGNIFICANT CHANGE UP (ref 1.7–9.3)
NEUTROPHILS # BLD AUTO: 4.69 K/UL — SIGNIFICANT CHANGE UP (ref 1.4–6.5)
NEUTROPHILS NFR BLD AUTO: 55.9 % — SIGNIFICANT CHANGE UP (ref 42.2–75.2)
NRBC # BLD: 0 /100 WBCS — SIGNIFICANT CHANGE UP (ref 0–0)
PLATELET # BLD AUTO: 273 K/UL — SIGNIFICANT CHANGE UP (ref 130–400)
PMV BLD: 11.3 FL — HIGH (ref 7.4–10.4)
POTASSIUM SERPL-MCNC: 4.6 MMOL/L — SIGNIFICANT CHANGE UP (ref 3.5–5)
POTASSIUM SERPL-SCNC: 4.6 MMOL/L — SIGNIFICANT CHANGE UP (ref 3.5–5)
PROT SERPL-MCNC: 6.8 G/DL — SIGNIFICANT CHANGE UP (ref 6–8)
RBC # BLD: 4.43 M/UL — LOW (ref 4.7–6.1)
RBC # FLD: 12.9 % — SIGNIFICANT CHANGE UP (ref 11.5–14.5)
SODIUM SERPL-SCNC: 137 MMOL/L — SIGNIFICANT CHANGE UP (ref 135–146)
WBC # BLD: 8.4 K/UL — SIGNIFICANT CHANGE UP (ref 4.8–10.8)
WBC # FLD AUTO: 8.4 K/UL — SIGNIFICANT CHANGE UP (ref 4.8–10.8)

## 2024-07-20 PROCEDURE — 99285 EMERGENCY DEPT VISIT HI MDM: CPT | Mod: 25

## 2024-07-20 PROCEDURE — 99285 EMERGENCY DEPT VISIT HI MDM: CPT

## 2024-07-20 PROCEDURE — 36415 COLL VENOUS BLD VENIPUNCTURE: CPT

## 2024-07-20 PROCEDURE — 83605 ASSAY OF LACTIC ACID: CPT

## 2024-07-20 PROCEDURE — 80053 COMPREHEN METABOLIC PANEL: CPT

## 2024-07-20 PROCEDURE — 96374 THER/PROPH/DIAG INJ IV PUSH: CPT | Mod: XU

## 2024-07-20 PROCEDURE — 85025 COMPLETE CBC W/AUTO DIFF WBC: CPT

## 2024-07-20 PROCEDURE — 74177 CT ABD & PELVIS W/CONTRAST: CPT | Mod: MC

## 2024-07-20 PROCEDURE — 83690 ASSAY OF LIPASE: CPT

## 2024-07-20 PROCEDURE — 74177 CT ABD & PELVIS W/CONTRAST: CPT | Mod: 26,MC

## 2024-07-20 RX ORDER — SODIUM CHLORIDE 0.9 % (FLUSH) 0.9 %
1000 SYRINGE (ML) INJECTION ONCE
Refills: 0 | Status: COMPLETED | OUTPATIENT
Start: 2024-07-20 | End: 2024-07-20

## 2024-07-20 RX ORDER — PEG3350/SOD SULF,BICARB,CL/KCL 240-22.72G
4000 SOLUTION, RECONSTITUTED, ORAL ORAL ONCE
Refills: 0 | Status: COMPLETED | OUTPATIENT
Start: 2024-07-20 | End: 2024-07-20

## 2024-07-20 RX ORDER — DICYCLOMINE HCL 10 MG
10 CAPSULE ORAL ONCE
Refills: 0 | Status: DISCONTINUED | OUTPATIENT
Start: 2024-07-20 | End: 2024-07-20

## 2024-07-20 RX ORDER — MORPHINE SULFATE 100 MG/1
4 TABLET, EXTENDED RELEASE ORAL ONCE
Refills: 0 | Status: COMPLETED | OUTPATIENT
Start: 2024-07-20 | End: 2024-07-20

## 2024-07-20 RX ORDER — KETOROLAC TROMETHAMINE 30 MG/ML
30 INJECTION, SOLUTION INTRAMUSCULAR ONCE
Refills: 0 | Status: DISCONTINUED | OUTPATIENT
Start: 2024-07-20 | End: 2024-07-20

## 2024-07-20 RX ADMIN — Medication 4000 MILLILITER(S): at 06:38

## 2024-07-20 RX ADMIN — KETOROLAC TROMETHAMINE 30 MILLIGRAM(S): 30 INJECTION, SOLUTION INTRAMUSCULAR at 04:31

## 2024-07-20 RX ADMIN — Medication 1000 MILLILITER(S): at 03:39

## 2024-07-24 ENCOUNTER — EMERGENCY (EMERGENCY)
Facility: HOSPITAL | Age: 49
LOS: 0 days | Discharge: HOME | End: 2024-07-24
Attending: EMERGENCY MEDICINE
Payer: MEDICAID

## 2024-07-24 VITALS
WEIGHT: 199.96 LBS | DIASTOLIC BLOOD PRESSURE: 108 MMHG | TEMPERATURE: 98 F | HEIGHT: 71 IN | OXYGEN SATURATION: 99 % | SYSTOLIC BLOOD PRESSURE: 176 MMHG | HEART RATE: 77 BPM | RESPIRATION RATE: 19 BRPM

## 2024-07-24 DIAGNOSIS — R07.81 PLEURODYNIA: ICD-10-CM

## 2024-07-24 DIAGNOSIS — R11.0 NAUSEA: ICD-10-CM

## 2024-07-24 DIAGNOSIS — R63.0 ANOREXIA: ICD-10-CM

## 2024-07-24 DIAGNOSIS — K63.89 OTHER SPECIFIED DISEASES OF INTESTINE: ICD-10-CM

## 2024-07-24 DIAGNOSIS — M54.9 DORSALGIA, UNSPECIFIED: ICD-10-CM

## 2024-07-24 DIAGNOSIS — R14.0 ABDOMINAL DISTENSION (GASEOUS): ICD-10-CM

## 2024-07-24 LAB
ALBUMIN SERPL ELPH-MCNC: 4.5 G/DL — SIGNIFICANT CHANGE UP (ref 3.5–5.2)
ALP SERPL-CCNC: 70 U/L — SIGNIFICANT CHANGE UP (ref 30–115)
ALT FLD-CCNC: 21 U/L — SIGNIFICANT CHANGE UP (ref 0–41)
ANION GAP SERPL CALC-SCNC: 13 MMOL/L — SIGNIFICANT CHANGE UP (ref 7–14)
AST SERPL-CCNC: 24 U/L — SIGNIFICANT CHANGE UP (ref 0–41)
BASOPHILS # BLD AUTO: 0.03 K/UL — SIGNIFICANT CHANGE UP (ref 0–0.2)
BASOPHILS NFR BLD AUTO: 0.3 % — SIGNIFICANT CHANGE UP (ref 0–1)
BILIRUB SERPL-MCNC: 0.4 MG/DL — SIGNIFICANT CHANGE UP (ref 0.2–1.2)
BUN SERPL-MCNC: 22 MG/DL — HIGH (ref 10–20)
CALCIUM SERPL-MCNC: 9.6 MG/DL — SIGNIFICANT CHANGE UP (ref 8.4–10.5)
CHLORIDE SERPL-SCNC: 99 MMOL/L — SIGNIFICANT CHANGE UP (ref 98–110)
CO2 SERPL-SCNC: 23 MMOL/L — SIGNIFICANT CHANGE UP (ref 17–32)
CREAT SERPL-MCNC: 1.2 MG/DL — SIGNIFICANT CHANGE UP (ref 0.7–1.5)
EGFR: 75 ML/MIN/1.73M2 — SIGNIFICANT CHANGE UP
EOSINOPHIL # BLD AUTO: 0.31 K/UL — SIGNIFICANT CHANGE UP (ref 0–0.7)
EOSINOPHIL NFR BLD AUTO: 3.4 % — SIGNIFICANT CHANGE UP (ref 0–8)
GLUCOSE SERPL-MCNC: 108 MG/DL — HIGH (ref 70–99)
HCT VFR BLD CALC: 40.2 % — LOW (ref 42–52)
HGB BLD-MCNC: 14 G/DL — SIGNIFICANT CHANGE UP (ref 14–18)
IMM GRANULOCYTES NFR BLD AUTO: 0.2 % — SIGNIFICANT CHANGE UP (ref 0.1–0.3)
LIDOCAIN IGE QN: 17 U/L — SIGNIFICANT CHANGE UP (ref 7–60)
LYMPHOCYTES # BLD AUTO: 2.51 K/UL — SIGNIFICANT CHANGE UP (ref 1.2–3.4)
LYMPHOCYTES # BLD AUTO: 27.9 % — SIGNIFICANT CHANGE UP (ref 20.5–51.1)
MCHC RBC-ENTMCNC: 30.6 PG — SIGNIFICANT CHANGE UP (ref 27–31)
MCHC RBC-ENTMCNC: 34.8 G/DL — SIGNIFICANT CHANGE UP (ref 32–37)
MCV RBC AUTO: 88 FL — SIGNIFICANT CHANGE UP (ref 80–94)
MONOCYTES # BLD AUTO: 0.69 K/UL — HIGH (ref 0.1–0.6)
MONOCYTES NFR BLD AUTO: 7.7 % — SIGNIFICANT CHANGE UP (ref 1.7–9.3)
NEUTROPHILS # BLD AUTO: 5.45 K/UL — SIGNIFICANT CHANGE UP (ref 1.4–6.5)
NEUTROPHILS NFR BLD AUTO: 60.5 % — SIGNIFICANT CHANGE UP (ref 42.2–75.2)
NRBC # BLD: 0 /100 WBCS — SIGNIFICANT CHANGE UP (ref 0–0)
PLATELET # BLD AUTO: 191 K/UL — SIGNIFICANT CHANGE UP (ref 130–400)
PMV BLD: 12.4 FL — HIGH (ref 7.4–10.4)
POTASSIUM SERPL-MCNC: 5.5 MMOL/L — HIGH (ref 3.5–5)
POTASSIUM SERPL-SCNC: 5.5 MMOL/L — HIGH (ref 3.5–5)
PROT SERPL-MCNC: 7.2 G/DL — SIGNIFICANT CHANGE UP (ref 6–8)
RBC # BLD: 4.57 M/UL — LOW (ref 4.7–6.1)
RBC # FLD: 13.3 % — SIGNIFICANT CHANGE UP (ref 11.5–14.5)
SODIUM SERPL-SCNC: 135 MMOL/L — SIGNIFICANT CHANGE UP (ref 135–146)
WBC # BLD: 9.01 K/UL — SIGNIFICANT CHANGE UP (ref 4.8–10.8)
WBC # FLD AUTO: 9.01 K/UL — SIGNIFICANT CHANGE UP (ref 4.8–10.8)

## 2024-07-24 PROCEDURE — 99284 EMERGENCY DEPT VISIT MOD MDM: CPT | Mod: 25

## 2024-07-24 PROCEDURE — 99284 EMERGENCY DEPT VISIT MOD MDM: CPT

## 2024-07-24 PROCEDURE — 85025 COMPLETE CBC W/AUTO DIFF WBC: CPT

## 2024-07-24 PROCEDURE — 99285 EMERGENCY DEPT VISIT HI MDM: CPT

## 2024-07-24 PROCEDURE — 74018 RADEX ABDOMEN 1 VIEW: CPT | Mod: 26

## 2024-07-24 PROCEDURE — 80053 COMPREHEN METABOLIC PANEL: CPT

## 2024-07-24 PROCEDURE — 36415 COLL VENOUS BLD VENIPUNCTURE: CPT

## 2024-07-24 PROCEDURE — 74018 RADEX ABDOMEN 1 VIEW: CPT

## 2024-07-24 PROCEDURE — 83690 ASSAY OF LIPASE: CPT

## 2024-07-24 PROCEDURE — 96374 THER/PROPH/DIAG INJ IV PUSH: CPT

## 2024-07-24 RX ORDER — SUCRALFATE 1 G
10 TABLET ORAL
Qty: 140 | Refills: 0
Start: 2024-07-24 | End: 2024-08-06

## 2024-07-24 RX ORDER — DIPHENHYDRAMINE HYDROCHLORIDE AND LIDOCAINE HYDROCHLORIDE AND ALUMINUM HYDROXIDE AND MAGNESIUM HYDRO
30 KIT ONCE
Refills: 0 | Status: COMPLETED | OUTPATIENT
Start: 2024-07-24 | End: 2024-07-24

## 2024-07-24 RX ORDER — POLYETHYLENE GLYCOL 3350 1 G/G
17 POWDER ORAL
Qty: 1 | Refills: 0
Start: 2024-07-24 | End: 2024-08-06

## 2024-07-24 RX ORDER — SODIUM CHLORIDE 0.9 % (FLUSH) 0.9 %
1000 SYRINGE (ML) INJECTION ONCE
Refills: 0 | Status: COMPLETED | OUTPATIENT
Start: 2024-07-24 | End: 2024-07-24

## 2024-07-24 RX ORDER — KETOROLAC TROMETHAMINE 30 MG/ML
15 INJECTION, SOLUTION INTRAMUSCULAR ONCE
Refills: 0 | Status: DISCONTINUED | OUTPATIENT
Start: 2024-07-24 | End: 2024-07-24

## 2024-07-24 RX ADMIN — Medication 1000 MILLILITER(S): at 03:47

## 2024-07-24 RX ADMIN — KETOROLAC TROMETHAMINE 15 MILLIGRAM(S): 30 INJECTION, SOLUTION INTRAMUSCULAR at 03:48

## 2024-07-24 RX ADMIN — DIPHENHYDRAMINE HYDROCHLORIDE AND LIDOCAINE HYDROCHLORIDE AND ALUMINUM HYDROXIDE AND MAGNESIUM HYDRO 30 MILLILITER(S): KIT at 02:53

## 2024-07-24 NOTE — ED PROVIDER NOTE - OBJECTIVE STATEMENT
48-year-old male past medical history of drug abuse, Bell's palsy presents for evaluation of abdominal pain.  Patient endorses pressure-like abdominal pain for the past month, improved with bending forward, no inciting factors.  Patient has been evaluated in the ED twice with a CT showing large amount of gas within the colon.  Patient states he has been taking laxatives without improvement.  Denies fever, headache, chest pain, shortness of breath, constipation, diarrhea, dysuria, hematuria, vomiting.

## 2024-07-24 NOTE — CONSULT NOTE ADULT - ASSESSMENT
ASSESSMENT:  48-year-old male past medical history of drug abuse, Bell's palsy presents for evaluation of abdominal pain. Surgery consulted for evaluation of colonic distention.     PLAN:  -Patient has colonic distention chronically, likely a dysmotility issue  -No acute surgical pathology, no evidence of obstruction, perforation, no acute surgical intervention at this time  -Recommend aggressive bowel regiment and GI evaluation for dysmotility issues  -Recall surgery as needed      [phone]    Above plan discussed with Attending Surgeon Dr. Altman , patient, patient family, and Primary team  07-24-24 @ 04:23

## 2024-07-24 NOTE — ED PROVIDER NOTE - ATTENDING APP SHARED VISIT CONTRIBUTION OF CARE
48-year-old male with a history of Bell's palsy with left facial droop, drug abuse, presents for evaluation of generalized abdominal pain, distention.  Patient reports that his symptoms started several weeks ago.  Patient presented to the ED the onset of symptoms and had CT abdomen pelvis done that showed large bowel distention with gas wall without any obvious obstruction.  Patient was discharged to follow-up outpatient however since the symptoms are worsening.  Patient returned 4 days ago and had again normal levels with normal CT abdomen pelvis reports anorexia, nausea without any vomiting.  VSS, non toxic appearing, NAD, Head NCAT, MMM, neck supple, normal ROM, normal s1s2, lungs ctab, abd s/nd, generalized tenderness to palpation, no guarding or rebound, extremities wnl, AAO x 3, GCS 15, neuro grossly normal. No acute skin lesions. Plan is labs, pain control, surgical consult and reassess

## 2024-07-24 NOTE — ED ADULT NURSE NOTE - NSFALLUNIVINTERV_ED_ALL_ED
Bed/Stretcher in lowest position, wheels locked, appropriate side rails in place/Call bell, personal items and telephone in reach/Instruct patient to call for assistance before getting out of bed/chair/stretcher/Non-slip footwear applied when patient is off stretcher/[place] to call system/Physically safe environment - no spills, clutter or unnecessary equipment/Purposeful proactive rounding/Room/bathroom lighting operational, light cord in reach

## 2024-07-24 NOTE — CONSULT NOTE ADULT - ATTENDING COMMENTS
Trauma/Acute Care Surgery Attending Note Attestation  Patient was seen and examined bedside.  I reviewed the resident/PA note and agreed with above assessment and plan with following additions and corrections.    History as above. Patient reports he came in today for bilateral rib and back pain. His abdominal distention improved with GoLytely. Passing flatus and BM.    T(C): 36.6 (07-24-24 @ 00:49), Max: 36.6 (07-24-24 @ 00:49)  HR: 77 (07-24-24 @ 00:49) (77 - 77)  BP: 176/108 (07-24-24 @ 00:49) (176/108 - 176/108)  RR: 19 (07-24-24 @ 00:49) (19 - 19)  SpO2: 99% (07-24-24 @ 00:49) (99% - 99%)    I independently performed a medically appropriate exam. The exam was notable for mild abdominal distention but abdomen otherwise not tender.                          14.0   9.01  )-----------( 191      ( 24 Jul 2024 02:40 )             40.2     07-24    135  |  99  |  22<H>  ----------------------------<  108<H>  5.5<H>   |  23  |  1.2    Ca 9.6; Mg x ; Phos x       ( 24 Jul 2024 02:40 )  Alb: 4.5 g/dL / Pro: 7.2 g/dL / AlkPhos: 70 U/L / ALT: 21 U/L / AST: 24 U/L / GGT:x     / Tbili 0.4 mg/dL/ Dbili x     / Indbili x          CT A/P 7/20 reviewed and interpreted by me: diffuse gaseous distention of transverse and descending colon with stool in right colon    Assessment/Plan:  48y Male PMH as above presenting with abdominal distention. Improved from four days ago per patient. No acute surgical intervention. Recommend bowel regimen and GI evaluation for intestinal dysmotility.    Case Altman MD  Trauma/Acute Care Surgery/Surgical Critical Care Attending

## 2024-07-24 NOTE — ED PROVIDER NOTE - CARE PROVIDER_API CALL
Jade Schwartz  Gastroenterology  [address] [place] [address]  Phone: [phone]  Fax: [phone]  Follow Up Time:

## 2024-07-24 NOTE — ED ADULT NURSE NOTE - CHIEF COMPLAINT
The patient is a 48y Male complaining of abdominal pain. Sarecycline Counseling: Patient advised regarding possible photosensitivity and discoloration of the teeth, skin, lips, tongue and gums.  Patient instructed to avoid sunlight, if possible.  When exposed to sunlight, patients should wear protective clothing, sunglasses, and sunscreen.  The patient was instructed to call the office immediately if the following severe adverse effects occur:  hearing changes, easy bruising/bleeding, severe headache, or vision changes.  The patient verbalized understanding of the proper use and possible adverse effects of sarecycline.  All of the patient's questions and concerns were addressed.

## 2024-07-24 NOTE — ED PROVIDER NOTE - PROGRESS NOTE DETAILS
Surgery consulted for intractable abd pain.  Pt evaluated by Dr. Altman who recommends bowel regiment and fu with GI.

## 2024-07-24 NOTE — ED PROVIDER NOTE - CLINICAL SUMMARY MEDICAL DECISION MAKING FREE TEXT BOX
Patient presented for evaluation abdominal pain and distention.  Labs with no acute findings.  Surgery consulted and they recommend no acute intervention while in the ED.  Patient to be discharged to follow-up outpatient.

## 2024-07-24 NOTE — ED PROVIDER NOTE - PHYSICAL EXAMINATION
CONST: NAD  EYES: Sclera and conjunctiva clear.   ENT: No nasal discharge. Oropharynx normal appearing  NECK: Non-tender, no meningeal signs. normal ROM. supple   CARD: S1 S2; No jvd  RESP: Equal BS B/L, No wheezes, rhonchi or rales. No distress  GI: Diffuse tenderness. Soft, non-distended. no cva tenderness. normal BS  MS: Normal ROM in all extremities. pulses 2 +. no calf tenderness or swelling  SKIN: Warm, dry, no acute rashes. Good turgor

## 2024-07-24 NOTE — ED PROVIDER NOTE - PATIENT PORTAL LINK FT
You can access the FollowMyHealth Patient Portal offered by Health system by registering at the following website: [email or web address]. By joining [email or web address]’s FollowMyHealth portal, you will also be able to view your health information using other applications (apps) compatible with our system.

## 2024-07-24 NOTE — CONSULT NOTE ADULT - SUBJECTIVE AND OBJECTIVE BOX
GENERAL SURGERY CONSULT NOTE    Patient: RAYMOND ALVAREZ , 48y ([birth year])Male   MRN: [id number]  Location: [facility] ED  Visit: 07-24-24 Emergency  Date: 07-24-24 @ 04:23    HPI: Objective Statement: 48-year-old male past medical history of drug abuse, Bell's palsy presents for evaluation of abdominal pain.  Patient endorses pressure-like abdominal pain for the past month, improved with bending forward, no inciting factors.  Patient has been evaluated in the ED twice with a CT showing large amount of gas within the colon, no recent imaging today. The patient reports that his abdominal pain is better than before. The patient has never been evaluated by GI. The patient reports that he has been passing flatus and BM with no issue.         PAST MEDICAL & SURGICAL HISTORY:  No pertinent past medical history      Substance abuse      No significant past surgical history          Home Medications:        VITALS:  T(F): 97.9 (07-24-24 @ 00:49), Max: 97.9 (07-24-24 @ 00:49)  HR: 77 (07-24-24 @ 00:49) (77 - 77)  BP: 176/108 (07-24-24 @ 00:49) (176/108 - 176/108)  RR: 19 (07-24-24 @ 00:49) (19 - 19)  SpO2: 99% (07-24-24 @ 00:49) (99% - 99%)    PHYSICAL EXAM:  General: NAD, AAOx3, calm and cooperative  HEENT: NCAT, ÁNGEL, EOMI, Trachea ML, Neck supple  Cardiac: RRR S1, S2  Respiratory: CTAB  Abdomen: Soft, non-distended, non-tender, no rebound, no guarding.      MEDICATIONS  (STANDING):    MEDICATIONS  (PRN):      LAB/STUDIES:                        14.0   9.01  )-----------( 191      ( 24 Jul 2024 02:40 )             40.2     07-24    135  |  99  |  22<H>  ----------------------------<  108<H>  5.5<H>   |  23  |  1.2    Ca    9.6      24 Jul 2024 02:40    TPro  7.2  /  Alb  4.5  /  TBili  0.4  /  DBili  x   /  AST  24  /  ALT  21  /  AlkPhos  70  07-24      LIVER FUNCTIONS - ( 24 Jul 2024 02:40 )  Alb: 4.5 g/dL / Pro: 7.2 g/dL / ALK PHOS: 70 U/L / ALT: 21 U/L / AST: 24 U/L / GGT: x           Urinalysis Basic - ( 24 Jul 2024 02:40 )    Color: x / Appearance: x / SG: x / pH: x  Gluc: 108 mg/dL / Ketone: x  / Bili: x / Urobili: x   Blood: x / Protein: x / Nitrite: x   Leuk Esterase: x / RBC: x / WBC x   Sq Epi: x / Non Sq Epi: x / Bacteria: x      IMAGING:      < from: CT Abdomen and Pelvis w/ IV Cont (07.20.24 @ 04:19) >  Since 7/9/2024:  Redemonstrated marked gaseous distention of the transverse and left colon   with gas and stool distention of the right colon. Sigmoid colon normal in   caliber however there are no mass lesions or evidence of mechanical   obstruction identified. Stable appearing exam.    < end of copied text >
